# Patient Record
Sex: MALE | Race: WHITE | NOT HISPANIC OR LATINO | Employment: FULL TIME | URBAN - METROPOLITAN AREA
[De-identification: names, ages, dates, MRNs, and addresses within clinical notes are randomized per-mention and may not be internally consistent; named-entity substitution may affect disease eponyms.]

---

## 2019-01-31 ENCOUNTER — APPOINTMENT (OUTPATIENT)
Dept: RADIOLOGY | Facility: IMAGING CENTER | Age: 29
End: 2019-01-31
Attending: PHYSICIAN ASSISTANT
Payer: COMMERCIAL

## 2019-01-31 ENCOUNTER — OFFICE VISIT (OUTPATIENT)
Dept: URGENT CARE | Facility: CLINIC | Age: 29
End: 2019-01-31
Payer: COMMERCIAL

## 2019-01-31 VITALS
DIASTOLIC BLOOD PRESSURE: 68 MMHG | HEART RATE: 85 BPM | BODY MASS INDEX: 21.98 KG/M2 | OXYGEN SATURATION: 98 % | TEMPERATURE: 99.3 F | HEIGHT: 68 IN | WEIGHT: 145 LBS | SYSTOLIC BLOOD PRESSURE: 100 MMHG | RESPIRATION RATE: 16 BRPM

## 2019-01-31 DIAGNOSIS — J98.01 BRONCHOSPASM: ICD-10-CM

## 2019-01-31 DIAGNOSIS — R55 SYNCOPE, UNSPECIFIED SYNCOPE TYPE: ICD-10-CM

## 2019-01-31 DIAGNOSIS — Z87.892 HISTORY OF ANAPHYLAXIS: ICD-10-CM

## 2019-01-31 LAB — EKG 4674: NORMAL

## 2019-01-31 PROCEDURE — 99204 OFFICE O/P NEW MOD 45 MIN: CPT | Performed by: PHYSICIAN ASSISTANT

## 2019-01-31 PROCEDURE — 71046 X-RAY EXAM CHEST 2 VIEWS: CPT | Mod: TC | Performed by: PHYSICIAN ASSISTANT

## 2019-01-31 PROCEDURE — 93000 ELECTROCARDIOGRAM COMPLETE: CPT | Performed by: PHYSICIAN ASSISTANT

## 2019-01-31 RX ORDER — ALBUTEROL SULFATE 90 UG/1
2 AEROSOL, METERED RESPIRATORY (INHALATION) EVERY 6 HOURS PRN
Qty: 1 INHALER | Refills: 0 | Status: SHIPPED | OUTPATIENT
Start: 2019-01-31 | End: 2019-06-04

## 2019-01-31 RX ORDER — EPINEPHRINE 0.3 MG/.3ML
0.3 INJECTION SUBCUTANEOUS ONCE
Qty: 0.3 ML | Refills: 0 | Status: SHIPPED | OUTPATIENT
Start: 2019-01-31 | End: 2019-01-31

## 2019-01-31 ASSESSMENT — ENCOUNTER SYMPTOMS
HEMOPTYSIS: 0
WHEEZING: 0
PALPITATIONS: 0
CHILLS: 0
DIZZINESS: 1
FEVER: 0
SHORTNESS OF BREATH: 0
SORE THROAT: 0
COUGH: 1
SPUTUM PRODUCTION: 0

## 2019-01-31 NOTE — PATIENT INSTRUCTIONS
Orthostatic Hypotension  Orthostatic hypotension is a sudden drop in blood pressure that happens when you quickly change positions, such as when you get up from a seated or lying position. Blood pressure is a measurement of how strongly, or weakly, your blood is pressing against the walls of your arteries. Arteries are blood vessels that carry blood from your heart throughout your body. When blood pressure is too low, you may not get enough blood to your brain or to the rest of your organs. This can cause weakness, light-headedness, rapid heartbeat, and fainting. This can last for just a few seconds or for up to a few minutes.  Orthostatic hypotension is usually not a serious problem. However, if it happens frequently or gets worse, it may be a sign of something more serious.  What are the causes?  This condition may be caused by:  · Sudden changes in posture, such as standing up quickly after you have been sitting or lying down.  · Blood loss.  · Loss of body fluids (dehydration).  · Heart problems.  · Hormone (endocrine) problems.  · Pregnancy.  · Severe infection.  · Lack of certain nutrients.  · Severe allergic reactions (anaphylaxis).  · Certain medicines, such as blood pressure medicine or medicines that make the body lose excess fluids (diuretics). Sometimes, this condition can be caused by not taking medicine as directed, such as taking too much of a certain medicine.  What increases the risk?  Certain factors can make you more likely to develop orthostatic hypotension, including:  · Age. Risk increases as you get older.  · Conditions that affect the heart or the central nervous system.  · Taking certain medicines, such as blood pressure medicine or diuretics.  · Being pregnant.  What are the signs or symptoms?  Symptoms of this condition may include:  · Weakness.  · Light-headedness.  · Dizziness.  · Blurred vision.  · Fatigue.  · Rapid heartbeat.  · Fainting, in severe cases.  How is this diagnosed?  This  "condition is diagnosed based on:  · Your medical history.  · Your symptoms.  · Your blood pressure measurement. Your health care provider will check your blood pressure when you are:  ¨ Lying down.  ¨ Sitting.  ¨ Standing.  A blood pressure reading is recorded as two numbers, such as \"120 over 80\" (or 120/80). The first (\"top\") number is called the systolic pressure. It is a measure of the pressure in your arteries as your heart beats. The second (\"bottom\") number is called the diastolic pressure. It is a measure of the pressure in your arteries when your heart relaxes between beats. Blood pressure is measured in a unit called mm Hg. Healthy blood pressure for adults is 120/80. If your blood pressure is below 90/60, you may be diagnosed with hypotension.  Other information or tests that may be used to diagnose orthostatic hypotension include:  · Your other vital signs, such as your heart rate and temperature.  · Blood tests.  · Tilt table test. For this test, you will be safely secured to a table that moves you from a lying position to an upright position. Your heart rhythm and blood pressure will be monitored during the test.  How is this treated?  Treatment for this condition may include:  · Changing your diet. This may involve eating more salt (sodium) or drinking more water.  · Taking medicines to raise your blood pressure.  · Changing the dosage of certain medicines you are taking that might be lowering your blood pressure.  · Wearing compression stockings. These stockings help to prevent blood clots and reduce swelling in your legs.  In some cases, you may need to go to the hospital for:  · Fluid replacement. This means you will receive fluids through an IV tube.  · Blood replacement. This means you will receive donated blood through an IV tube (transfusion).  · Treating an infection or heart problems, if this applies.  · Monitoring. You may need to be monitored while medicines that you are taking wear " off.  Follow these instructions at home:  Eating and drinking  · Drink enough fluid to keep your urine clear or pale yellow.  · Eat a healthy diet and follow instructions from your health care provider about eating or drinking restrictions. A healthy diet includes:  ¨ Fresh fruits and vegetables.  ¨ Whole grains.  ¨ Lean meats.  ¨ Low-fat dairy products.  · Eat extra salt only as directed. Do not add extra salt to your diet unless your health care provider told you to do that.  · Eat frequent, small meals.  · Avoid standing up suddenly after eating.  Medicines  · Take over-the-counter and prescription medicines only as told by your health care provider.  ¨ Follow instructions from your health care provider about changing the dosage of your current medicines, if this applies.  ¨ Do not stop or adjust any of your medicines on your own.  General instructions  · Wear compression stockings as told by your health care provider.  · Get up slowly from lying down or sitting positions. This gives your blood pressure a chance to adjust.  · Avoid hot showers and excessive heat as directed by your health care provider.  · Return to your normal activities as told by your health care provider. Ask your health care provider what activities are safe for you.  · Do not use any products that contain nicotine or tobacco, such as cigarettes and e-cigarettes. If you need help quitting, ask your health care provider.  · Keep all follow-up visits as told by your health care provider. This is important.  Contact a health care provider if:  · You vomit.  · You have diarrhea.  · You have a fever for more than 2-3 days.  · You feel more thirsty than usual.  · You feel weak and tired.  Get help right away if:  · You have chest pain.  · You have a fast or irregular heartbeat.  · You develop numbness in any part of your body.  · You cannot move your arms or your legs.  · You have trouble speaking.  · You become sweaty or feel lightheaded.  · You  faint.  · You feel short of breath.  · You have trouble staying awake.  · You feel confused.  This information is not intended to replace advice given to you by your health care provider. Make sure you discuss any questions you have with your health care provider.  Document Released: 12/08/2003 Document Revised: 09/05/2017 Document Reviewed: 06/09/2017  [employer] Patient Education © 2017 [employer]

## 2019-02-01 NOTE — PROGRESS NOTES
Subjective:      Garcia Hilton is a 28 y.o. 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 )            Cough   This is a new problem. The current episode started in the past 7 days. The problem has been waxing and waning. The problem occurs constantly. Associated symptoms include chest pain. Pertinent negatives include no chills, ear pain, fever, hemoptysis, sore throat, shortness of breath or wheezing. Associated symptoms comments: Faint  . The symptoms are aggravated by lying down. He has tried nothing for the symptoms.       Review of Systems   Constitutional: Positive for malaise/fatigue. Negative for chills and fever.   HENT: Negative for congestion, ear pain and sore throat.    Respiratory: Positive for cough. Negative for hemoptysis, sputum production, shortness of breath and wheezing.    Cardiovascular: Positive for chest pain. Negative for palpitations.   Neurological: Positive for dizziness.   All other systems reviewed and are negative.    PMH:  has no past medical history on file.  MEDS:   Current Outpatient Prescriptions:   •  EPINEPHrine (EPIPEN) 0.3 MG/0.3ML Solution Auto-injector solution for injection, 0.3 mL by Intramuscular route Once for 1 dose., Disp: 0.3 mL, Rfl: 0  •  albuterol (PROAIR HFA) 108 (90 Base) MCG/ACT Aero Soln inhalation aerosol, Inhale 2 Puffs by mouth every 6 hours as needed for Shortness of Breath., Disp: 1 Inhaler, Rfl: 0  ALLERGIES: Not on File  SURGHX: History reviewed. No pertinent surgical history.  SOCHX:  reports that he has never smoked. He has never used smokeless tobacco. He reports that he drinks alcohol. He reports that he uses drugs, including Marijuana.  FH: Family history was reviewed, no pertinent findings to report  Medications, Allergies, and current problem list reviewed today in Epic       Objective:     /68   Pulse 85   Temp 37.4 °C (99.3  "°F)   Resp 16   Ht 1.727 m (5' 8\")   Wt 65.8 kg (145 lb)   SpO2 98%   BMI 22.05 kg/m²      Physical Exam   Constitutional: He is oriented to person, place, and time. He appears well-developed and well-nourished. He is active.  Non-toxic appearance. He does not have a sickly appearance. He does not appear ill. No distress. He is not intubated.   HENT:   Head: Normocephalic and atraumatic.   Right Ear: Hearing, tympanic membrane, external ear and ear canal normal.   Left Ear: Hearing, tympanic membrane, external ear and ear canal normal.   Nose: Nose normal.   Mouth/Throat: Uvula is midline, oropharynx is clear and moist and mucous membranes are normal.   Eyes: Conjunctivae, EOM and lids are normal.   Neck: Normal range of motion and full passive range of motion without pain. Neck supple.   Cardiovascular: Regular rhythm, S1 normal, S2 normal and normal heart sounds.  Exam reveals no gallop and no friction rub.    No murmur heard.  Pulmonary/Chest: Effort normal and breath sounds normal. No accessory muscle usage. No apnea, no tachypnea and no bradypnea. He is not intubated. No respiratory distress. He has no decreased breath sounds. He has no wheezes. He has no rhonchi. He has no rales. He exhibits no tenderness.   Musculoskeletal: Normal range of motion.   Neurological: He is alert and oriented to person, place, and time.   Skin: Skin is warm and dry.   Psychiatric: He has a normal mood and affect. His speech is normal and behavior is normal. Judgment and thought content normal.   Vitals reviewed.              1/31/2019 1:59 PM    HISTORY/REASON FOR EXAM:  Shortness of Breath syncope.      TECHNIQUE/EXAM DESCRIPTION AND NUMBER OF VIEWS:  Two views of the chest.    COMPARISON:  None available.    FINDINGS:      The mediastinal and cardiac silhouette is unremarkable.    The pulmonary vascularity is within normal limits.    The lung parenchyma is clear.    There is no significant pleural effusion.    There is no " visible pneumothorax.    There are no acute bony abnormalities.   Impression       1.  Unremarkable two view chest.     Interpreted by me     Rhythm: normal sinus   Rate: normal  Axis: normal  Ectopy: none  Conduction: normal  ST Segments: no acute change  T Waves: no acute change  Q Waves: none     Clinical Impression: no acute changes and normal EKG    Assessment/Plan:   Pt is a 28 yr old male who presents with cough for one week.  He states that he gets coughing fits while skiing.  He was experiencing some light headedness yesterday while at work.  Vitals normal. Lungs and heart sounds normal.  Chest x ray is normal.  No red flags on ekg.  Likely bronchospasm.  Pt also requests refill of epipen for food allergy.    1. Bronchospasm    - EKG  - DX-CHEST-2 VIEWS; Future  - albuterol (PROAIR HFA) 108 (90 Base) MCG/ACT Aero Soln inhalation aerosol; Inhale 2 Puffs by mouth every 6 hours as needed for Shortness of Breath.  Dispense: 1 Inhaler; Refill: 0    2. History of anaphylaxis    - EPINEPHrine (EPIPEN) 0.3 MG/0.3ML Solution Auto-injector solution for injection; 0.3 mL by Intramuscular route Once for 1 dose.  Dispense: 0.3 mL; Refill: 0  - REFERRAL TO ALLERGY    Differential diagnosis, natural history, supportive care discussed. Follow-up with primary care provider within 7-10 days, emergency room precautions discussed.  Patient and/or family appears understanding of information.  Handout and review of patients diagnosis and treatment was discussed extensively.

## 2019-02-03 ENCOUNTER — TELEPHONE (OUTPATIENT)
Dept: URGENT CARE | Facility: CLINIC | Age: 29
End: 2019-02-03

## 2019-02-03 NOTE — TELEPHONE ENCOUNTER
Patient called saying that he had called the pharmacy to ask if his prescriptions were ready but they only had the albuterol on file. He is going to  the prescription now, but he said he was also supposed to get a prescription for an EpiPen as well, and wanted to know if that could be called in for him.

## 2019-02-04 ENCOUNTER — TELEPHONE (OUTPATIENT)
Dept: URGENT CARE | Facility: CLINIC | Age: 29
End: 2019-02-04

## 2019-02-04 NOTE — TELEPHONE ENCOUNTER
Called pharmacy to ask about the problem the patient had with picking up his EpiPen. The pharmacy tech told me that he had already picked it up on 02/03/19.

## 2019-06-04 ENCOUNTER — OFFICE VISIT (OUTPATIENT)
Dept: URGENT CARE | Facility: CLINIC | Age: 29
End: 2019-06-04
Payer: COMMERCIAL

## 2019-06-04 ENCOUNTER — TELEPHONE (OUTPATIENT)
Dept: SCHEDULING | Facility: IMAGING CENTER | Age: 29
End: 2019-06-04

## 2019-06-04 ENCOUNTER — APPOINTMENT (OUTPATIENT)
Dept: RADIOLOGY | Facility: MEDICAL CENTER | Age: 29
End: 2019-06-04
Attending: INTERNAL MEDICINE
Payer: COMMERCIAL

## 2019-06-04 ENCOUNTER — HOSPITAL ENCOUNTER (OUTPATIENT)
Facility: MEDICAL CENTER | Age: 29
End: 2019-06-05
Attending: EMERGENCY MEDICINE | Admitting: INTERNAL MEDICINE
Payer: COMMERCIAL

## 2019-06-04 VITALS
TEMPERATURE: 98.6 F | HEART RATE: 98 BPM | DIASTOLIC BLOOD PRESSURE: 46 MMHG | WEIGHT: 145 LBS | RESPIRATION RATE: 16 BRPM | OXYGEN SATURATION: 97 % | HEIGHT: 68 IN | BODY MASS INDEX: 21.98 KG/M2 | SYSTOLIC BLOOD PRESSURE: 96 MMHG

## 2019-06-04 DIAGNOSIS — R55 VASOVAGAL NEAR SYNCOPE: Primary | ICD-10-CM

## 2019-06-04 DIAGNOSIS — I95.9 HYPOTENSION, UNSPECIFIED HYPOTENSION TYPE: ICD-10-CM

## 2019-06-04 DIAGNOSIS — A41.9 SEPSIS, DUE TO UNSPECIFIED ORGANISM: ICD-10-CM

## 2019-06-04 DIAGNOSIS — R55 PRE-SYNCOPE: ICD-10-CM

## 2019-06-04 PROBLEM — D72.829 LEUKOCYTOSIS: Status: ACTIVE | Noted: 2019-06-04

## 2019-06-04 LAB
ALBUMIN SERPL BCP-MCNC: 4.7 G/DL (ref 3.2–4.9)
ALBUMIN/GLOB SERPL: 1.6 G/DL
ALP SERPL-CCNC: 52 U/L (ref 30–99)
ALT SERPL-CCNC: 15 U/L (ref 2–50)
ANION GAP SERPL CALC-SCNC: 9 MMOL/L (ref 0–11.9)
APPEARANCE UR: CLEAR
AST SERPL-CCNC: 18 U/L (ref 12–45)
BASOPHILS # BLD AUTO: 0.5 % (ref 0–1.8)
BASOPHILS # BLD: 0.15 K/UL (ref 0–0.12)
BILIRUB SERPL-MCNC: 0.8 MG/DL (ref 0.1–1.5)
BILIRUB UR QL STRIP.AUTO: NEGATIVE
BUN SERPL-MCNC: 18 MG/DL (ref 8–22)
CALCIUM SERPL-MCNC: 9.9 MG/DL (ref 8.5–10.5)
CHLORIDE SERPL-SCNC: 104 MMOL/L (ref 96–112)
CO2 SERPL-SCNC: 26 MMOL/L (ref 20–33)
COLOR UR: YELLOW
COMMENT 1642: NORMAL
CREAT SERPL-MCNC: 1.06 MG/DL (ref 0.5–1.4)
EOSINOPHIL # BLD AUTO: 0.02 K/UL (ref 0–0.51)
EOSINOPHIL NFR BLD: 0.1 % (ref 0–6.9)
ERYTHROCYTE [DISTWIDTH] IN BLOOD BY AUTOMATED COUNT: 39.8 FL (ref 35.9–50)
FLUAV RNA SPEC QL NAA+PROBE: NEGATIVE
FLUBV RNA SPEC QL NAA+PROBE: NEGATIVE
GLOBULIN SER CALC-MCNC: 3 G/DL (ref 1.9–3.5)
GLUCOSE SERPL-MCNC: 97 MG/DL (ref 65–99)
GLUCOSE UR STRIP.AUTO-MCNC: NEGATIVE MG/DL
HCT VFR BLD AUTO: 45.3 % (ref 42–52)
HGB BLD-MCNC: 15.6 G/DL (ref 14–18)
IMM GRANULOCYTES # BLD AUTO: 0.18 K/UL (ref 0–0.11)
IMM GRANULOCYTES NFR BLD AUTO: 0.6 % (ref 0–0.9)
KETONES UR STRIP.AUTO-MCNC: ABNORMAL MG/DL
LACTATE BLD-SCNC: 1.3 MMOL/L (ref 0.5–2)
LEUKOCYTE ESTERASE UR QL STRIP.AUTO: NEGATIVE
LYMPHOCYTES # BLD AUTO: 1.43 K/UL (ref 1–4.8)
LYMPHOCYTES NFR BLD: 4.7 % (ref 22–41)
MAGNESIUM SERPL-MCNC: 1.9 MG/DL (ref 1.5–2.5)
MCH RBC QN AUTO: 31 PG (ref 27–33)
MCHC RBC AUTO-ENTMCNC: 34.4 G/DL (ref 33.7–35.3)
MCV RBC AUTO: 90.1 FL (ref 81.4–97.8)
MICRO URNS: ABNORMAL
MONOCYTES # BLD AUTO: 0.62 K/UL (ref 0–0.85)
MONOCYTES NFR BLD AUTO: 2 % (ref 0–13.4)
MORPHOLOGY BLD-IMP: NORMAL
NEUTROPHILS # BLD AUTO: 28.13 K/UL (ref 1.82–7.42)
NEUTROPHILS NFR BLD: 92.1 % (ref 44–72)
NITRITE UR QL STRIP.AUTO: NEGATIVE
NRBC # BLD AUTO: 0 K/UL
NRBC BLD-RTO: 0 /100 WBC
PH UR STRIP.AUTO: 6.5 [PH]
PLATELET # BLD AUTO: 221 K/UL (ref 164–446)
PMV BLD AUTO: 11.5 FL (ref 9–12.9)
POTASSIUM SERPL-SCNC: 4.2 MMOL/L (ref 3.6–5.5)
PROT SERPL-MCNC: 7.7 G/DL (ref 6–8.2)
PROT UR QL STRIP: NEGATIVE MG/DL
RBC # BLD AUTO: 5.03 M/UL (ref 4.7–6.1)
RBC UR QL AUTO: NEGATIVE
SODIUM SERPL-SCNC: 139 MMOL/L (ref 135–145)
SP GR UR STRIP.AUTO: 1.02
UROBILINOGEN UR STRIP.AUTO-MCNC: 0.2 MG/DL
WBC # BLD AUTO: 30.4 K/UL (ref 4.8–10.8)

## 2019-06-04 PROCEDURE — 99214 OFFICE O/P EST MOD 30 MIN: CPT | Performed by: PHYSICIAN ASSISTANT

## 2019-06-04 PROCEDURE — 85540 WBC ALKALINE PHOSPHATASE: CPT

## 2019-06-04 PROCEDURE — 93005 ELECTROCARDIOGRAM TRACING: CPT | Performed by: STUDENT IN AN ORGANIZED HEALTH CARE EDUCATION/TRAINING PROGRAM

## 2019-06-04 PROCEDURE — 36415 COLL VENOUS BLD VENIPUNCTURE: CPT

## 2019-06-04 PROCEDURE — 80053 COMPREHEN METABOLIC PANEL: CPT

## 2019-06-04 PROCEDURE — 81003 URINALYSIS AUTO W/O SCOPE: CPT

## 2019-06-04 PROCEDURE — 99285 EMERGENCY DEPT VISIT HI MDM: CPT

## 2019-06-04 PROCEDURE — 83605 ASSAY OF LACTIC ACID: CPT

## 2019-06-04 PROCEDURE — 71045 X-RAY EXAM CHEST 1 VIEW: CPT

## 2019-06-04 PROCEDURE — 87502 INFLUENZA DNA AMP PROBE: CPT

## 2019-06-04 PROCEDURE — 87040 BLOOD CULTURE FOR BACTERIA: CPT | Mod: 91

## 2019-06-04 PROCEDURE — 83735 ASSAY OF MAGNESIUM: CPT

## 2019-06-04 PROCEDURE — G0378 HOSPITAL OBSERVATION PER HR: HCPCS

## 2019-06-04 PROCEDURE — 700105 HCHG RX REV CODE 258: Performed by: STUDENT IN AN ORGANIZED HEALTH CARE EDUCATION/TRAINING PROGRAM

## 2019-06-04 PROCEDURE — 85025 COMPLETE CBC W/AUTO DIFF WBC: CPT

## 2019-06-04 RX ORDER — BISACODYL 10 MG
10 SUPPOSITORY, RECTAL RECTAL
Status: DISCONTINUED | OUTPATIENT
Start: 2019-06-04 | End: 2019-06-05 | Stop reason: HOSPADM

## 2019-06-04 RX ORDER — ACETAMINOPHEN 325 MG/1
650 TABLET ORAL EVERY 6 HOURS PRN
Status: DISCONTINUED | OUTPATIENT
Start: 2019-06-04 | End: 2019-06-05 | Stop reason: HOSPADM

## 2019-06-04 RX ORDER — LABETALOL HYDROCHLORIDE 5 MG/ML
10 INJECTION, SOLUTION INTRAVENOUS EVERY 4 HOURS PRN
Status: DISCONTINUED | OUTPATIENT
Start: 2019-06-04 | End: 2019-06-05 | Stop reason: HOSPADM

## 2019-06-04 RX ORDER — SODIUM CHLORIDE 9 MG/ML
500 INJECTION, SOLUTION INTRAVENOUS
Status: DISCONTINUED | OUTPATIENT
Start: 2019-06-04 | End: 2019-06-04

## 2019-06-04 RX ORDER — AMOXICILLIN 250 MG
2 CAPSULE ORAL 2 TIMES DAILY
Status: DISCONTINUED | OUTPATIENT
Start: 2019-06-04 | End: 2019-06-05 | Stop reason: HOSPADM

## 2019-06-04 RX ORDER — GUAIFENESIN/DEXTROMETHORPHAN 100-10MG/5
10 SYRUP ORAL EVERY 6 HOURS PRN
Status: DISCONTINUED | OUTPATIENT
Start: 2019-06-04 | End: 2019-06-05 | Stop reason: HOSPADM

## 2019-06-04 RX ORDER — POLYETHYLENE GLYCOL 3350 17 G/17G
1 POWDER, FOR SOLUTION ORAL
Status: DISCONTINUED | OUTPATIENT
Start: 2019-06-04 | End: 2019-06-05 | Stop reason: HOSPADM

## 2019-06-04 RX ORDER — SODIUM CHLORIDE 9 MG/ML
INJECTION, SOLUTION INTRAVENOUS CONTINUOUS
Status: DISCONTINUED | OUTPATIENT
Start: 2019-06-04 | End: 2019-06-05

## 2019-06-04 RX ADMIN — SODIUM CHLORIDE: 9 INJECTION, SOLUTION INTRAVENOUS at 19:48

## 2019-06-04 ASSESSMENT — LIFESTYLE VARIABLES
TOTAL SCORE: 0
EVER_SMOKED: NEVER
TOTAL SCORE: 0
ALCOHOL_USE: YES
HAVE YOU EVER FELT YOU SHOULD CUT DOWN ON YOUR DRINKING: NO
ON A TYPICAL DAY WHEN YOU DRINK ALCOHOL HOW MANY DRINKS DO YOU HAVE: 1
HAVE PEOPLE ANNOYED YOU BY CRITICIZING YOUR DRINKING: NO
AVERAGE NUMBER OF DAYS PER WEEK YOU HAVE A DRINK CONTAINING ALCOHOL: 3
CONSUMPTION TOTAL: POSITIVE
TOTAL SCORE: 0
EVER FELT BAD OR GUILTY ABOUT YOUR DRINKING: NO
HOW MANY TIMES IN THE PAST YEAR HAVE YOU HAD 5 OR MORE DRINKS IN A DAY: 5
EVER HAD A DRINK FIRST THING IN THE MORNING TO STEADY YOUR NERVES TO GET RID OF A HANGOVER: NO

## 2019-06-04 ASSESSMENT — PATIENT HEALTH QUESTIONNAIRE - PHQ9
1. LITTLE INTEREST OR PLEASURE IN DOING THINGS: NOT AT ALL
SUM OF ALL RESPONSES TO PHQ9 QUESTIONS 1 AND 2: 0
2. FEELING DOWN, DEPRESSED, IRRITABLE, OR HOPELESS: NOT AT ALL

## 2019-06-04 NOTE — PROGRESS NOTES
Subjective:      Pt is a 28 y.o. male who presents with near syncope          HPI  This is a recurrent issue. Pt notes worsening episodes of feeling faint x 3 days and was diagnosed with orthostatic hypotension in the past but notes no cardiac work up and now notes more frequent episodes despite appropriate hydration and eating well and exercising. Pt has not taken any Rx medications for this condition. Pt states the pain is a 1-2/10 during near syncopal event, aching in nature and worse at night. Pt denies CP, SOB, NVD, paresthesias, headaches,  change in vision, hives, or other joint pain. The pt's medication list, problem list, and allergies have been evaluated and reviewed during today's visit.    PMH:  Negative per pt.      PSH:  Negative per pt.        Fam Hx:  the patient's family history is not pertinent to their current complaint      Soc HX:  Social History     Social History   • Marital status: Single     Spouse name: N/A   • Number of children: N/A   • Years of education: N/A     Occupational History   • Not on file.     Social History Main Topics   • Smoking status: Never Smoker   • Smokeless tobacco: Never Used   • Alcohol use Yes      Comment: socially   • Drug use: Yes     Types: Marijuana      Comment: occa   • Sexual activity: Not on file     Other Topics Concern   • Not on file     Social History Narrative   • No narrative on file         Medications:    Current Outpatient Prescriptions:   •  albuterol (PROAIR HFA) 108 (90 Base) MCG/ACT Aero Soln inhalation aerosol, Inhale 2 Puffs by mouth every 6 hours as needed for Shortness of Breath., Disp: 1 Inhaler, Rfl: 0      Allergies:  Patient has no allergy information on record.    ROS  Constitutional: Negative for fever, chills and malaise/fatigue.   HENT: Negative for congestion and sore throat.    Eyes: Negative for blurred vision, double vision and photophobia.   Respiratory: Negative for cough and shortness of breath.  Cardiovascular: Negative for  chest pain and palpitations.   Gastrointestinal: Negative for heartburn, nausea, vomiting, abdominal pain, diarrhea and constipation.   Genitourinary: Negative for dysuria and flank pain.   Musculoskeletal: Negative for joint pain and myalgias.   Skin: Negative for itching and rash.   Neurological: +dizziness with near syncope  Endo/Heme/Allergies: Does not bruise/bleed easily.   Psychiatric/Behavioral: Negative for depression. The patient is not nervous/anxious.           Objective:        Vitals:    06/04/19 1305   BP: (!) 96/46   Pulse: 98   Resp: 16   Temp: 37 °C (98.6 °F)   SpO2: 97%         Physical Exam      Constitutional: PT is oriented to person, place, and time. PT appears well-developed and well-nourished. No distress.   HENT:   Head: Normocephalic and atraumatic.   Mouth/Throat: Oropharynx is clear and moist. No oropharyngeal exudate.   Eyes: Conjunctivae normal and EOM are normal. Pupils are equal, round, and reactive to light.   Neck: Normal range of motion. Neck supple. No thyromegaly present.   Cardiovascular: Normal rate, regular rhythm, normal heart sounds and intact distal pulses.  Exam reveals no gallop and no friction rub.    No murmur heard.  Pulmonary/Chest: Effort normal and breath sounds normal. No respiratory distress. PT has no wheezes. PT has no rales. Pt exhibits no tenderness.   Abdominal: Soft. Bowel sounds are normal. PT exhibits no distension and no mass. There is no tenderness. There is no rebound and no guarding.   Musculoskeletal: Normal range of motion. PT exhibits no edema and no tenderness.   Neurological: PT is alert and oriented to person, place, and time. PT has normal reflexes. No cranial nerve deficit.   Skin: Skin is warm and dry. No rash noted. PT is not diaphoretic. No erythema.       Psychiatric: PT has a normal mood and affect. PT behavior is normal. Judgment and thought content normal.          Assessment/Plan:     1. Vasovagal near syncope  2.  Hypotension    EKG-->NSR     PT will likely need full cardiac work up  TO [facility] NOW for further evaluation. Spoke with ER charge nurse, Murray ,on the phone, attending at the ER , and she graciously accepted transfer of care for further imaging and evaluation of the pt. Reiterated to patient that although a provider to provider transfer was made this will not necessarily expedite the ER process.  ER care coordinator was not available  PT to go POV to ER now  AVS with medical info given.  Pt was in full understanding and agreement with the plan.  Differential diagnosis, natural history, supportive care, and indications for immediate follow-up discussed. All questions answered. Patient agrees with the plan of care.  Follow-up as needed if symptoms worsen or fail to improve.

## 2019-06-04 NOTE — ED NOTES
Ambulatory to 67 with same report as triage note.  Reports for past few months has had episodes of lightheaded, nausea, slight SOB, and cold sweats.  Reports intensity of symptoms increased last night prompting him to go to  this am.

## 2019-06-04 NOTE — ED TRIAGE NOTES
"Pt sent from Urgent care for episode of \"low blood pressure\". Systolic 94. Pt sent for further care, pt states \"dealing with orthostatic BP for 2 months\". No symptoms at this time.   "

## 2019-06-05 ENCOUNTER — APPOINTMENT (OUTPATIENT)
Dept: CARDIOLOGY | Facility: MEDICAL CENTER | Age: 29
End: 2019-06-05
Attending: STUDENT IN AN ORGANIZED HEALTH CARE EDUCATION/TRAINING PROGRAM
Payer: COMMERCIAL

## 2019-06-05 VITALS
RESPIRATION RATE: 20 BRPM | SYSTOLIC BLOOD PRESSURE: 125 MMHG | BODY MASS INDEX: 22.05 KG/M2 | DIASTOLIC BLOOD PRESSURE: 69 MMHG | TEMPERATURE: 99.1 F | WEIGHT: 145.5 LBS | OXYGEN SATURATION: 97 % | HEART RATE: 75 BPM | HEIGHT: 68 IN

## 2019-06-05 LAB
ALBUMIN SERPL BCP-MCNC: 3.9 G/DL (ref 3.2–4.9)
ALBUMIN/GLOB SERPL: 1.6 G/DL
ALP SERPL-CCNC: 41 U/L (ref 30–99)
ALT SERPL-CCNC: 11 U/L (ref 2–50)
ANION GAP SERPL CALC-SCNC: 7 MMOL/L (ref 0–11.9)
AST SERPL-CCNC: 17 U/L (ref 12–45)
BASOPHILS # BLD AUTO: 0.4 % (ref 0–1.8)
BASOPHILS # BLD: 0.05 K/UL (ref 0–0.12)
BILIRUB SERPL-MCNC: 0.5 MG/DL (ref 0.1–1.5)
BUN SERPL-MCNC: 20 MG/DL (ref 8–22)
CALCIUM SERPL-MCNC: 9.1 MG/DL (ref 8.5–10.5)
CHLORIDE SERPL-SCNC: 111 MMOL/L (ref 96–112)
CO2 SERPL-SCNC: 24 MMOL/L (ref 20–33)
CORTIS SERPL-MCNC: 7.9 UG/DL (ref 0–23)
CREAT SERPL-MCNC: 1 MG/DL (ref 0.5–1.4)
CRP SERPL HS-MCNC: 8.41 MG/DL (ref 0–0.75)
EKG IMPRESSION: NORMAL
EOSINOPHIL # BLD AUTO: 0.19 K/UL (ref 0–0.51)
EOSINOPHIL NFR BLD: 1.4 % (ref 0–6.9)
ERYTHROCYTE [DISTWIDTH] IN BLOOD BY AUTOMATED COUNT: 42.1 FL (ref 35.9–50)
ERYTHROCYTE [SEDIMENTATION RATE] IN BLOOD BY WESTERGREN METHOD: 8 MM/HOUR (ref 0–15)
GLOBULIN SER CALC-MCNC: 2.4 G/DL (ref 1.9–3.5)
GLUCOSE SERPL-MCNC: 99 MG/DL (ref 65–99)
HCT VFR BLD AUTO: 39.8 % (ref 42–52)
HGB BLD-MCNC: 13.5 G/DL (ref 14–18)
IMM GRANULOCYTES # BLD AUTO: 0.04 K/UL (ref 0–0.11)
IMM GRANULOCYTES NFR BLD AUTO: 0.3 % (ref 0–0.9)
LV EJECT FRACT  99904: 65
LV EJECT FRACT MOD 2C 99903: 67.63
LV EJECT FRACT MOD 4C 99902: 61.47
LV EJECT FRACT MOD BP 99901: 63.08
LYMPHOCYTES # BLD AUTO: 1.78 K/UL (ref 1–4.8)
LYMPHOCYTES NFR BLD: 13.2 % (ref 22–41)
MCH RBC QN AUTO: 31.5 PG (ref 27–33)
MCHC RBC AUTO-ENTMCNC: 33.9 G/DL (ref 33.7–35.3)
MCV RBC AUTO: 92.8 FL (ref 81.4–97.8)
MONOCYTES # BLD AUTO: 0.49 K/UL (ref 0–0.85)
MONOCYTES NFR BLD AUTO: 3.6 % (ref 0–13.4)
NEUTROPHILS # BLD AUTO: 10.96 K/UL (ref 1.82–7.42)
NEUTROPHILS NFR BLD: 81.1 % (ref 44–72)
NRBC # BLD AUTO: 0 K/UL
NRBC BLD-RTO: 0 /100 WBC
PLATELET # BLD AUTO: 186 K/UL (ref 164–446)
PMV BLD AUTO: 12 FL (ref 9–12.9)
POTASSIUM SERPL-SCNC: 4 MMOL/L (ref 3.6–5.5)
PROT SERPL-MCNC: 6.3 G/DL (ref 6–8.2)
RBC # BLD AUTO: 4.29 M/UL (ref 4.7–6.1)
SODIUM SERPL-SCNC: 142 MMOL/L (ref 135–145)
T4 FREE SERPL-MCNC: 1.16 NG/DL (ref 0.53–1.43)
TSH SERPL DL<=0.005 MIU/L-ACNC: 3.14 UIU/ML (ref 0.38–5.33)
WBC # BLD AUTO: 13.5 K/UL (ref 4.8–10.8)

## 2019-06-05 PROCEDURE — 80053 COMPREHEN METABOLIC PANEL: CPT

## 2019-06-05 PROCEDURE — 84439 ASSAY OF FREE THYROXINE: CPT

## 2019-06-05 PROCEDURE — 93306 TTE W/DOPPLER COMPLETE: CPT

## 2019-06-05 PROCEDURE — 700105 HCHG RX REV CODE 258: Performed by: INTERNAL MEDICINE

## 2019-06-05 PROCEDURE — 93010 ELECTROCARDIOGRAM REPORT: CPT | Performed by: INTERNAL MEDICINE

## 2019-06-05 PROCEDURE — 86140 C-REACTIVE PROTEIN: CPT

## 2019-06-05 PROCEDURE — 700105 HCHG RX REV CODE 258: Performed by: STUDENT IN AN ORGANIZED HEALTH CARE EDUCATION/TRAINING PROGRAM

## 2019-06-05 PROCEDURE — 84443 ASSAY THYROID STIM HORMONE: CPT

## 2019-06-05 PROCEDURE — 93306 TTE W/DOPPLER COMPLETE: CPT | Mod: 26 | Performed by: INTERNAL MEDICINE

## 2019-06-05 PROCEDURE — 36415 COLL VENOUS BLD VENIPUNCTURE: CPT

## 2019-06-05 PROCEDURE — 82533 TOTAL CORTISOL: CPT

## 2019-06-05 PROCEDURE — 700111 HCHG RX REV CODE 636 W/ 250 OVERRIDE (IP): Performed by: STUDENT IN AN ORGANIZED HEALTH CARE EDUCATION/TRAINING PROGRAM

## 2019-06-05 PROCEDURE — 85652 RBC SED RATE AUTOMATED: CPT

## 2019-06-05 PROCEDURE — 96365 THER/PROPH/DIAG IV INF INIT: CPT

## 2019-06-05 PROCEDURE — 85025 COMPLETE CBC W/AUTO DIFF WBC: CPT

## 2019-06-05 PROCEDURE — 99235 HOSP IP/OBS SAME DATE MOD 70: CPT | Mod: GC | Performed by: INTERNAL MEDICINE

## 2019-06-05 PROCEDURE — G0378 HOSPITAL OBSERVATION PER HR: HCPCS

## 2019-06-05 RX ORDER — SODIUM CHLORIDE 9 MG/ML
1000 INJECTION, SOLUTION INTRAVENOUS ONCE
Status: COMPLETED | OUTPATIENT
Start: 2019-06-05 | End: 2019-06-05

## 2019-06-05 RX ADMIN — CEFTRIAXONE SODIUM 1 G: 1 INJECTION, POWDER, FOR SOLUTION INTRAMUSCULAR; INTRAVENOUS at 06:07

## 2019-06-05 RX ADMIN — SODIUM CHLORIDE: 9 INJECTION, SOLUTION INTRAVENOUS at 09:25

## 2019-06-05 RX ADMIN — SODIUM CHLORIDE 1000 ML: 9 INJECTION, SOLUTION INTRAVENOUS at 13:03

## 2019-06-05 ASSESSMENT — ENCOUNTER SYMPTOMS
PALPITATIONS: 0
HEARTBURN: 0
TINGLING: 0
TREMORS: 0
COUGH: 1
EYE PAIN: 0
DIAPHORESIS: 1
BACK PAIN: 0
MYALGIAS: 0
HEADACHES: 1
HEMOPTYSIS: 0
ABDOMINAL PAIN: 0
LOSS OF CONSCIOUSNESS: 0
FOCAL WEAKNESS: 0
EYE REDNESS: 0
SPEECH CHANGE: 0
VOMITING: 0
DIARRHEA: 0
ORTHOPNEA: 0
WHEEZING: 0
CONSTIPATION: 0
WEIGHT LOSS: 0
BLURRED VISION: 0
NERVOUS/ANXIOUS: 0
DIZZINESS: 0
DOUBLE VISION: 0
SHORTNESS OF BREATH: 1
SPUTUM PRODUCTION: 1
NECK PAIN: 0
SENSORY CHANGE: 0
NAUSEA: 0
FEVER: 0
BRUISES/BLEEDS EASILY: 0
CHILLS: 0
EYE DISCHARGE: 0
FLANK PAIN: 0
BLOOD IN STOOL: 0
SORE THROAT: 0
WEAKNESS: 0
DEPRESSION: 0
SINUS PAIN: 0

## 2019-06-05 NOTE — ED PROVIDER NOTES
CHIEF COMPLAINT(1/4)  Chief Complaint   Patient presents with   • Sent by MD [place]  Garcia Hilton is a 28 y.o. male who presents with recurrent worsening episodes of presyncope for 2 months.  These episodes happen when he is standing for a few hours, he has a sensation of flushing, lightheadedness, palpitation, tunnel vision where he would have to lie down and wait for a few hours for the presyncope to pass.  He was diagnosed with orthostatic hypotension at urgent care but never had orthostatic vital signs according to him and was instructed to hydrate.  But this has been happening more frequently.  Last night, he had an episode of subjective fevers, nausea, sweating and chills with a similar episode of presyncope when he was lying down.  He went to urgent care where he had low normal blood pressure systolic 90s and sent to ER for further evaluation.    He denies any sore throat, cough, shortness of breath, dysuria, urgency, frequency, diarrhea, recent viral illness, sinusitis, dental procedure or dental infection.    He denies any IV drug use.  He has been with current female partner for 2 years and consistently uses condom for protection.    REVIEW OF SYSTEMS(1/10)  Pertinent positives include: Lightheadedness, palpitation, vision changes,.  Pertinent negatives include: denies any sore throat, cough, chest pain, shortness of breath, dysuria, urgency, frequency, diarrhea, recent viral illness, sinusitis, dental procedure or dental infection. Denies any open skin wound.   All other systems are negative.     PAST MEDICAL HISTORY(PFS1,2)   He denies any medical problem    FAMILY HISTORY  Father has hypertension.  Paternal grandmother had coronary artery disease.     SOCIAL HISTORY  Social History   Substance Use Topics   • Smoking status: Never Smoker   • Smokeless tobacco: Never Used   • Alcohol use Yes      Comment: socially     History   Drug Use   • Types: Marijuana     Comment: occa       CURRENT  MEDICATIONS  Home Medications     Reviewed by Mayra Varela R.N. (Registered Nurse) on 06/04/19 at 1619  Med List Status: Complete   Medication Last Dose Status        Patient Bennie Taking any Medications                       ALLERGIES  Allergies   Allergen Reactions   • Dust Mite Extract      Trouble breathing   • Egg White [Albumin]      Swollen and itchiness   • Fish      Hives, trouble breathing       PHYSICAL EXAM  VITAL SIGNS: /78   Pulse (!) 109   Temp 37.2 °C (99 °F) (Temporal)   Resp 16   Wt 67.3 kg (148 lb 5.9 oz)   SpO2 93%   BMI 22.56 kg/m²  Reviewed   Constitutional: Well developed, Well nourished, not in distress.  HENT: Normocephalic, atraumatic, bilateral external ears normal, oropharynx moist, No exudates or erythema. Neck is supple.  No cervical lymphadenopathy.  Eyes: PERRLA, conjunctiva pink, no scleral icterus.   Cardiovascular: S1, S2, no murmur, rub or gallop.  Respiratory: Clear to auscultation bilaterally without any wheezing or crepitation.  Gastrointestinal: Soft, nontender, bowel sounds present, no hepatosplenomegaly.  Skin: No erythema, no rash.   Genitourinary:  No costovertebral angle tenderness.   Neurologic: Alert & oriented x 3, cranial nerves 2-12 intact by passive exam.  No focal deficit noted.    Psychiatric: Affect normal, Judgment normal, Mood normal.     DIFFERENTIAL DIAGNOSIS:  Presyncope -vasovagal, arrhythmia, orthostatic hypotension  Infection -endocarditis, bacteremia of unclear source, viral infection   Hematologic malignancy, sepsis        RADIOLOGY/PROCEDURES  DX-CHEST-PORTABLE (1 VIEW)   Final Result      No evidence of acute cardiopulmonary process.          LABORATORY: Reviewed as below.  Results for orders placed or performed during the hospital encounter of 06/04/19   CBC WITH DIFFERENTIAL   Result Value Ref Range    WBC 30.4 (HH) 4.8 - 10.8 K/uL    RBC 5.03 4.70 - 6.10 M/uL    Hemoglobin 15.6 14.0 - 18.0 g/dL    Hematocrit 45.3 42.0 - 52.0 %     MCV 90.1 81.4 - 97.8 fL    MCH 31.0 27.0 - 33.0 pg    MCHC 34.4 33.7 - 35.3 g/dL    RDW 39.8 35.9 - 50.0 fL    Platelet Count 221 164 - 446 K/uL    MPV 11.5 9.0 - 12.9 fL    Neutrophils-Polys 92.10 (H) 44.00 - 72.00 %    Lymphocytes 4.70 (L) 22.00 - 41.00 %    Monocytes 2.00 0.00 - 13.40 %    Eosinophils 0.10 0.00 - 6.90 %    Basophils 0.50 0.00 - 1.80 %    Immature Granulocytes 0.60 0.00 - 0.90 %    Nucleated RBC 0.00 /100 WBC    Neutrophils (Absolute) 28.13 (H) 1.82 - 7.42 K/uL    Lymphs (Absolute) 1.43 1.00 - 4.80 K/uL    Monos (Absolute) 0.62 0.00 - 0.85 K/uL    Eos (Absolute) 0.02 0.00 - 0.51 K/uL    Baso (Absolute) 0.15 (H) 0.00 - 0.12 K/uL    Immature Granulocytes (abs) 0.18 (H) 0.00 - 0.11 K/uL    NRBC (Absolute) 0.00 K/uL   COMP METABOLIC PANEL   Result Value Ref Range    Sodium 139 135 - 145 mmol/L    Potassium 4.2 3.6 - 5.5 mmol/L    Chloride 104 96 - 112 mmol/L    Co2 26 20 - 33 mmol/L    Anion Gap 9.0 0.0 - 11.9    Glucose 97 65 - 99 mg/dL    Bun 18 8 - 22 mg/dL    Creatinine 1.06 0.50 - 1.40 mg/dL    Calcium 9.9 8.5 - 10.5 mg/dL    AST(SGOT) 18 12 - 45 U/L    ALT(SGPT) 15 2 - 50 U/L    Alkaline Phosphatase 52 30 - 99 U/L    Total Bilirubin 0.8 0.1 - 1.5 mg/dL    Albumin 4.7 3.2 - 4.9 g/dL    Total Protein 7.7 6.0 - 8.2 g/dL    Globulin 3.0 1.9 - 3.5 g/dL    A-G Ratio 1.6 g/dL   LACTIC ACID   Result Value Ref Range    Lactic Acid 1.3 0.5 - 2.0 mmol/L       INTERVENTIONS:  Will allow the admitting to choose antibiotics or observe patient pending blood cultures.       COURSE & MEDICAL DECISION MAKING  28-year-old healthy male came in with recurrent episodes of presyncope, subjective fever, chills. VSS, mild tachycardia during observation at the ER. physical exam was unremarkable without any clear source of infection. Labs show marked leukocytosis, WBC 30s with neutrophilia, suggestive of acute infection.   Blood cultures are obtained, chest x-ray and UA are pending.  He does not have insurance and does  not have a good plan for follow-up.  He will be admitted to [facility] for further work-up for sepsis, unclear source of infection, monitor blood culture,  possibly echocardiogram. discussed with Dr. Claudio who agreed to admit the patient.   Care is transferred to [facility] service and patient will be admitted in stable condition.    Review nursing notes and vital signs a final time 7:13 PM    PLAN:  Admission to [facility] service    CONDITION: guarded.    FINAL IMPRESSION  1. Sepsis, due to unspecified organism (HCC)    2.      Leukocytosis  3.      Near syncope      Electronically signed by: Francine Duncan, 6/4/2019 7:13 PM    I independently evaluated the patient and repeated the important components of the history and physical. I discussed the management with the resident. I have reviewed and agree with the pertinent clinical information above including history, exam, study findings, and recommendations.     Electronically signed by: Vasquez Quintero, 6/4/2019 8:04 PM

## 2019-06-05 NOTE — DISCHARGE INSTRUCTIONS
Discharge Instructions    Discharged to home by car with relative. Discharged via wheelchair, hospital escort: Yes.  Special equipment needed: Not Applicable    Be sure to schedule a follow-up appointment with your primary care doctor or any specialists as instructed.     Discharge Plan:   Diet Plan: Discussed  Activity Level: Discussed  Confirmed Follow up Appointment: Patient to Call and Schedule Appointment  Confirmed Symptoms Management: Discussed  Medication Reconciliation Updated: Yes  Influenza Vaccine Indication: Patient Refuses    I understand that a diet low in cholesterol, fat, and sodium is recommended for good health. Unless I have been given specific instructions below for another diet, I accept this instruction as my diet prescription.   Other diet: Heart healthy     Special Instructions: None    · Is patient discharged on Warfarin / Coumadin?   No     Depression / Suicide Risk    As you are discharged from this [facility] facility, it is important to learn how to keep safe from harming yourself.    Recognize the warning signs:  · Abrupt changes in personality, positive or negative- including increase in energy   · Giving away possessions  · Change in eating patterns- significant weight changes-  positive or negative  · Change in sleeping patterns- unable to sleep or sleeping all the time   · Unwillingness or inability to communicate  · Depression  · Unusual sadness, discouragement and loneliness  · Talk of wanting to die  · Neglect of personal appearance   · Rebelliousness- reckless behavior  · Withdrawal from people/activities they love  · Confusion- inability to concentrate     If you or a loved one observes any of these behaviors or has concerns about self-harm, here's what you can do:  · Talk about it- your feelings and reasons for harming yourself  · Remove any means that you might use to hurt yourself (examples: pills, rope, extension cords, firearm)  · Get professional help from the  community (Mental Health, Substance Abuse, psychological counseling)  · Do not be alone:Call your Safe Contact- someone whom you trust who will be there for you.  · Call your local CRISIS HOTLINE 942-2266 or 903-925-7467  · Call your local Children's Mobile Crisis Response Team [place] (679) 178-7093 or www.Attributor  · Call the toll free National Suicide Prevention Hotlines   · National Suicide Prevention Lifeline 388-556-OYMS (1488)  · National Hope Line Network 800-SUICIDE (926-9464)

## 2019-06-05 NOTE — PROGRESS NOTES
Pt consumed 100% of regular breakfast tray, tolerated well, denies any nausea/palpitations at this time

## 2019-06-05 NOTE — DISCHARGE SUMMARY
Internal Medicine Discharge Summary  Note Author: Kraig Bowser M.D.       Name Garcia Hilton     1990   Age/Sex 28 y.o. male   MRN [id number]         Admit Date:  2019       Discharge Date:   19    Service:   [facility] Internal Medicine [place] Team  Attending Physician(s):   Renato       Senior Resident(s):   Leon  Jj Resident(s):  Mague  PCP: Pcp Pt States None      Primary Diagnosis:   Orthostasis    Secondary Diagnoses:                Principal Problem:    Leukocytosis POA: Yes  Active Problems:    Pre-syncope POA: Yes  Resolved Problems:    * No resolved hospital problems. *      Hospital Summary (Brief Narrative):       28 y.o. male,Otherwise healthy without significant past medical history, recurrent presyncopal symptoms (sudden onset blurred vision/lightheadedness/dizziness/occipital headache/diaphoresis) occurring 3 times in the past month.  Incidentally noted to have white blood cell count of 30.4.  Patient does not meet sepsis criteria, did receive 1 g of ceftriaxone in emergency department.  Repeat white blood cell count the same day 13.5.  Suspect laboratory error.  He does report a recent history of trip to the [place] for 4 days and use of LSD and cocaine during that time, 2 weeks ago.    After 1 L fluid bolus today, orthostasis corrected.  Patient is in sinus bradycardia (high 50s) to sinus rhythm, without any arrhythmia, however he has not had any symptomatic orthostatic events.  A.m. cortisol (0600) 7.1, low normal.  Echocardiogram was normal, TSH was normal, free T4 was normal.    Patient is medically stable for discharge and he would like to go.  Follow-up with post inpatient (per scheduling) within 1 week, after labs, to be drawn on Monday.  Labs drawn Monday include CBC (monitor white blood cell count), cortisol stimulation test.  Recommend patient have Holter monitoring versus event monitoring.  Patient also reports that he felt anxious during these events,  has a strong family history of anxiety, and feels that he may have undiagnosed anxiety.  Consider anxiety treatment.  Patient also requests cardiology and neurology follow-up.  We discussed that some preliminary testing may be necessary before this can happen.    Patient given strict recall criteria to emergency department.  Recommend urine drug screen if patient returns to emergency department.        Patient /Hospital Summary (Details -- Problem Oriented) :          Pre-syncope   Assessment & Plan    Multiple episodes of almost passing out accompanied with nausea, diaphoresis, visual changes; happens when he stands quickly most of the time but last night occurred while laying down.   EKG was unrevealing   -Tele: Sinus rhythm/sinus bradycardia in high 50s (age-appropriate)  -Orthostatic vitals resolved after 1 L fluid bolus  -Echo normal  -Random cortisol, drawn at 600, 7.1 (low normal)  -Follow-up with primary care provider, establish a [facility], 1 week.  Follow-up labs this upcoming Monday, including cortisol stimulation test.     * Leukocytosis   Assessment & Plan    Multiple episodes of almost passing out accompanied with nausea, diaphoresis, visual changes; happens when he stands quickly most of the time but last night occurred while laying down.  Not associated with vertigo.  Echo was normal.  Patient had orthostatic tachycardia without hypotension, not meeting the criteria for postural orthostatic hypotension (greater than 40 bpm increase)  EKG was unrevealing   -Tele: Sinus rhythm/sinus bradycardia in high 50s (age-appropriate)           Consultants:     None    Procedures:        None    Imaging/ Testing:      EC-ECHOCARDIOGRAM COMPLETE W/O CONT   Final Result      DX-CHEST-PORTABLE (1 VIEW)   Final Result      No evidence of acute cardiopulmonary process.             Discharge Medications:         Medication Reconciliation: Completed       Medication List      You have not been prescribed any medications.          Can use .DISCHARGEMEDSLIST if going to another facility         Disposition:   To home with strict ED recall criteria in fair condition.    Diet:   Resume home diet    Activity:   As tolerated.    Instructions:        The patient was instructed to return to the ER in the event of worsening symptoms. I have counseled the patient on the importance of compliance and the patient has agreed to proceed with all medical recommendations and follow up plan indicated above.   The patient understands that all medications come with benefits and risks. Risks may include permanent injury or death and these risks can be minimized with close reassessment and monitoring.        Primary Care Provider:      Discharge summary faxed to primary care provider:  Deferred  Copy of discharge summary given to the patient: Deferred      Follow up appointment details :       Follow-up with post inpatient (per scheduling) within 1 week, after labs, to be drawn on Monday.  Labs drawn Monday include CBC (monitor white blood cell count), cortisol stimulation test.  Recommend patient have Holter monitoring versus event monitoring.  Patient also reports that he felt anxious during these events, has a strong family history of anxiety, and feels that he may have undiagnosed anxiety.  Consider anxiety treatment.     Pending Studies:        None    Time spent on discharge day patient visit, preparing discharge paperwork and arranging for patient follow up.    Summary of follow up issues:   Follow-up with constipation clinic for see follow-up appointment details.    Discharge Time (Minutes) :    90  Hospital Course Type:  Inpatient Stay >2 midnights      Condition on Discharge    ______________________________________________________________________    Interval history/exam for day of discharge:    See interval note 6/5/2019.  See above for updates since this note.      Most Recent Labs:    Lab Results   Component Value Date/Time    WBC 13.5 (H)  06/05/2019 06:00 AM    RBC 4.29 (L) 06/05/2019 06:00 AM    HEMOGLOBIN 13.5 (L) 06/05/2019 06:00 AM    HEMATOCRIT 39.8 (L) 06/05/2019 06:00 AM    MCV 92.8 06/05/2019 06:00 AM    MCH 31.5 06/05/2019 06:00 AM    MCHC 33.9 06/05/2019 06:00 AM    MPV 12.0 06/05/2019 06:00 AM    NEUTSPOLYS 81.10 (H) 06/05/2019 06:00 AM    LYMPHOCYTES 13.20 (L) 06/05/2019 06:00 AM    MONOCYTES 3.60 06/05/2019 06:00 AM    EOSINOPHILS 1.40 06/05/2019 06:00 AM    BASOPHILS 0.40 06/05/2019 06:00 AM      Lab Results   Component Value Date/Time    SODIUM 142 06/05/2019 06:00 AM    POTASSIUM 4.0 06/05/2019 06:00 AM    CHLORIDE 111 06/05/2019 06:00 AM    CO2 24 06/05/2019 06:00 AM    GLUCOSE 99 06/05/2019 06:00 AM    BUN 20 06/05/2019 06:00 AM    CREATININE 1.00 06/05/2019 06:00 AM      Lab Results   Component Value Date/Time    ALTSGPT 11 06/05/2019 06:00 AM    ASTSGOT 17 06/05/2019 06:00 AM    ALKPHOSPHAT 41 06/05/2019 06:00 AM    TBILIRUBIN 0.5 06/05/2019 06:00 AM    ALBUMIN 3.9 06/05/2019 06:00 AM    GLOBULIN 2.4 06/05/2019 06:00 AM     No results found for: PROTHROMBTM, INR

## 2019-06-05 NOTE — ASSESSMENT & PLAN NOTE
Multiple episodes of almost passing out accompanied with nausea, diaphoresis, visual changes; happens when he stands quickly most of the time but last night occurred while laying down.   EKG was unrevealing   -Tele: Sinus rhythm/sinus bradycardia in high 50s (age-appropriate)  -Orthostatic vitals resolved after 1 L fluid bolus  -Echo normal  -Random cortisol, drawn at 600, 7.1 (low normal)  -Follow-up with primary care provider, establish a [facility], 1 week.  Follow-up labs this upcoming Monday, including cortisol stimulation test.

## 2019-06-05 NOTE — H&P
Internal Medicine Admitting History and Physical    Note Author: Quin Roche M.D.       Name Garcia Hilton     1990   Age/Sex 28 y.o. male   MRN [id number]   Code Status FULL     After 5PM or if no immediate response to page, please call for cross-coverage  Attending/Team: Dr. Gross/Peter  See Patient List for primary contact information  Call [phone] to page    1st Call - Day Intern (R1):   Dr. Bowser 2nd Call - Day Sr. Resident (R2/R3):   Dr. Quintero        Chief Complaint:   Pre-syncope     HPI:  Mr. Hilton is a 28 year old male with no significant past medical history who presents with presyncope.  He states he has had multiple episodes of orthostatic hypotension which he describes as being worse upon standing up quickly with prodromal symptoms of dizziness, tunnel vision, diaphoresis, nausea.  He has had these symptoms for months however has noted them to be more frequent occurring 2-3 times in the last week.  He states last night was the first time he had the symptoms while laying down and also had a left-sided occipital headache dull in quality which was relieved with rest.  He has never lost consciousness.  Last night, he also had some productive cough of brown/green sputum, shortness of breath and night sweats.  Also describes some visual changes of images appearing even when he is not looking at them.  He has not been seen by an eye doctor.  He was seen by urgent care months ago where the diagnosed him with orthostatic hypotension and recommended increased fluid intake and getting up slowly.  He states he has been adhering to this recommendation and is unsure why he has had increase in his symptoms.  His fever/chills, joint pains, weight loss, myalgias, abdominal pain, photophobia, current headache, ear pain, urinary symptoms, bowel symptoms, facial pain.    He was seen at the urgent care where EKG was done and was sent to the ED for additional work-up.  In the emergency room, he  initially presented with an increased heart rate which returned to about 70s 80s on exam, otherwise vitals are stable.  CBC showed a elevated WBC count of 30 K.  No other significant lab abnormalities.  Chest x-ray was negative, influenza negative.      No personal or family history of blood disorders.  Monagamous with female partner x 2 years, states he uses condoms 100% of time. Patient reports STD testing in the past. Recent travel to [place] x 4 days, returned about 2 weeks ago. In March traveled to [employer], layover in [place]. No known sick contacts. No identifiable triggers to these events.   Takes a daily multiitamin, and ashwaganda for anxiety. Never smoker, social alcohol use, marijuana use occasionally, while in [place] used LSD and cocaine. No IV drug use      Review of Systems   Constitutional: Positive for diaphoresis. Negative for chills, fever, malaise/fatigue and weight loss.   HENT: Positive for congestion. Negative for ear discharge, ear pain, hearing loss, sinus pain, sore throat and tinnitus.    Eyes: Negative for blurred vision, double vision, pain, discharge and redness.   Respiratory: Positive for cough, sputum production and shortness of breath. Negative for hemoptysis and wheezing.    Cardiovascular: Negative for chest pain, palpitations, orthopnea and leg swelling.   Gastrointestinal: Negative for abdominal pain, blood in stool, constipation, diarrhea, heartburn, nausea and vomiting.   Genitourinary: Negative for dysuria, flank pain, frequency, hematuria and urgency.   Musculoskeletal: Negative for back pain, joint pain, myalgias and neck pain.   Skin: Negative for itching and rash.   Neurological: Positive for headaches. Negative for dizziness, tingling, tremors, sensory change, speech change, focal weakness, loss of consciousness and weakness.   Endo/Heme/Allergies: Negative for environmental allergies. Does not bruise/bleed easily.   Psychiatric/Behavioral: Negative for depression and  "suicidal ideas. The patient is not nervous/anxious.      Past Medical History (Chronic medical problem, known complications and current treatment)    History reviewed. No pertinent past medical history.    Past Surgical History:  History reviewed. No pertinent surgical history.    Current Outpatient Medications:  Home Medications     Reviewed by Corbin Green (Pharmacy Tech) on 06/04/19 at 2005  Med List Status: Complete   Medication Last Dose Status        Patient Bennie Taking any Medications                     Medication Allergy/Sensitivities:  Allergies   Allergen Reactions   • Dust Mite Extract      Trouble breathing   • Egg White [Albumin]      Swollen and itchiness   • Fish      Hives, trouble breathing     Family History (mandatory)   Family History   Problem Relation Age of Onset   • Hypertension Father    • Prostate cancer Paternal Grandmother        Social History (mandatory)   Social History     Social History   • Marital status: Single     Spouse name: N/A   • Number of children: N/A   • Years of education: N/A     Occupational History   • Not on file.     Social History Main Topics   • Smoking status: Never Smoker   • Smokeless tobacco: Never Used   • Alcohol use Yes      Comment: socially   • Drug use: Yes     Types: Marijuana      Comment: occasional, rare cocaine/LSD use    • Sexual activity: Not on file     Other Topics Concern   • Not on file     Social History Narrative   • No narrative on file     Living situation: Patient lives in [place], works as a cook   PCP : Pcp Pt States None    Physical Exam     Vitals:    06/04/19 1750 06/04/19 1934 06/04/19 2020 06/04/19 2314   BP: 105/78 134/76 127/68 120/57   Pulse: (!) 109 89  85   Resp: 16 16 16 20   Temp:   37.4 °C (99.4 °F) 37.3 °C (99.2 °F)   TempSrc:   Temporal Temporal   SpO2: 93% 95% 95% 96%   Weight:   66 kg (145 lb 8.1 oz)    Height:   1.727 m (5' 8\")      Body mass index is 22.12 kg/m².  /57   Pulse 85   Temp 37.3 °C (99.2 °F) " "(Temporal)   Resp 20   Ht 1.727 m (5' 8\")   Wt 66 kg (145 lb 8.1 oz)   SpO2 96%   BMI 22.12 kg/m²   O2 therapy: Pulse Oximetry: 96 %, O2 (LPM): 0, O2 Delivery: None (Room Air)    Physical Exam   Constitutional: He is oriented to person, place, and time and well-developed, well-nourished, and in no distress.   HENT:   Head: Normocephalic and atraumatic.   Mouth/Throat: Oropharynx is clear and moist.   (+) brownish material occluding right EAC  Left TM visualized, no abnormality   Mildly congested nasal turbinates   No oral lesions noted, no tonsillar hypertrophy or exudates    Eyes: Pupils are equal, round, and reactive to light. Conjunctivae and EOM are normal. Right eye exhibits no discharge. Left eye exhibits no discharge.   Neck: Normal range of motion. Neck supple.   Cardiovascular: Normal rate, regular rhythm, normal heart sounds and intact distal pulses.  Exam reveals no gallop and no friction rub.    No murmur heard.  Pulmonary/Chest: Effort normal and breath sounds normal. No respiratory distress. He has no wheezes. He has no rales.   Abdominal: Soft. Bowel sounds are normal. He exhibits no distension. There is no tenderness. There is no rebound and no guarding.   Genitourinary:   Genitourinary Comments: Rectal exam declined    Musculoskeletal: Normal range of motion. He exhibits no edema.   Lymphadenopathy:     He has no cervical adenopathy.   Neurological: He is alert and oriented to person, place, and time. He has normal sensation, normal strength, normal reflexes and intact cranial nerves. GCS score is 15.   (-) kernigs and brudzinski    Skin: Skin is warm and dry.   Erythematous plaque at the (L) ankle flexure surface, no other rashes noted    Psychiatric: Affect and judgment normal.   Vitals reviewed.    Data Review       Old Records Request:   Deferred  Current Records review/summary: Completed    Lab Data Review:  Recent Results (from the past 24 hour(s))   CBC WITH DIFFERENTIAL    Collection " Time: 06/04/19  3:17 PM   Result Value Ref Range    WBC 30.4 (HH) 4.8 - 10.8 K/uL    RBC 5.03 4.70 - 6.10 M/uL    Hemoglobin 15.6 14.0 - 18.0 g/dL    Hematocrit 45.3 42.0 - 52.0 %    MCV 90.1 81.4 - 97.8 fL    MCH 31.0 27.0 - 33.0 pg    MCHC 34.4 33.7 - 35.3 g/dL    RDW 39.8 35.9 - 50.0 fL    Platelet Count 221 164 - 446 K/uL    MPV 11.5 9.0 - 12.9 fL    Neutrophils-Polys 92.10 (H) 44.00 - 72.00 %    Lymphocytes 4.70 (L) 22.00 - 41.00 %    Monocytes 2.00 0.00 - 13.40 %    Eosinophils 0.10 0.00 - 6.90 %    Basophils 0.50 0.00 - 1.80 %    Immature Granulocytes 0.60 0.00 - 0.90 %    Nucleated RBC 0.00 /100 WBC    Neutrophils (Absolute) 28.13 (H) 1.82 - 7.42 K/uL    Lymphs (Absolute) 1.43 1.00 - 4.80 K/uL    Monos (Absolute) 0.62 0.00 - 0.85 K/uL    Eos (Absolute) 0.02 0.00 - 0.51 K/uL    Baso (Absolute) 0.15 (H) 0.00 - 0.12 K/uL    Immature Granulocytes (abs) 0.18 (H) 0.00 - 0.11 K/uL    NRBC (Absolute) 0.00 K/uL   COMP METABOLIC PANEL    Collection Time: 06/04/19  3:17 PM   Result Value Ref Range    Sodium 139 135 - 145 mmol/L    Potassium 4.2 3.6 - 5.5 mmol/L    Chloride 104 96 - 112 mmol/L    Co2 26 20 - 33 mmol/L    Anion Gap 9.0 0.0 - 11.9    Glucose 97 65 - 99 mg/dL    Bun 18 8 - 22 mg/dL    Creatinine 1.06 0.50 - 1.40 mg/dL    Calcium 9.9 8.5 - 10.5 mg/dL    AST(SGOT) 18 12 - 45 U/L    ALT(SGPT) 15 2 - 50 U/L    Alkaline Phosphatase 52 30 - 99 U/L    Total Bilirubin 0.8 0.1 - 1.5 mg/dL    Albumin 4.7 3.2 - 4.9 g/dL    Total Protein 7.7 6.0 - 8.2 g/dL    Globulin 3.0 1.9 - 3.5 g/dL    A-G Ratio 1.6 g/dL   ESTIMATED GFR    Collection Time: 06/04/19  3:17 PM   Result Value Ref Range    GFR If African American >60 >60 mL/min/1.73 m 2    GFR If Non African American >60 >60 mL/min/1.73 m 2   PERIPHERAL SMEAR REVIEW    Collection Time: 06/04/19  3:17 PM   Result Value Ref Range    Peripheral Smear Review see below    DIFFERENTIAL COMMENT    Collection Time: 06/04/19  3:17 PM   Result Value Ref Range    Comments-Diff  see below    MAGNESIUM    Collection Time: 19  3:17 PM   Result Value Ref Range    Magnesium 1.9 1.5 - 2.5 mg/dL   LACTIC ACID    Collection Time: 19  4:25 PM   Result Value Ref Range    Lactic Acid 1.3 0.5 - 2.0 mmol/L   Influenza A/B By PCR (Adult - Flu Only)    Collection Time: 19  7:40 PM   Result Value Ref Range    Influenza virus A RNA Negative Negative    Influenza virus B, PCR Negative Negative   EKG    Collection Time: 19  9:40 PM   Result Value Ref Range    Report       [facility]    Test Date:  2019  Pt Name:    LARRY IGLESIAS               Department: ER  MRN:        [id number]                      Room:       T215  Gender:     Male                         Technician: JAN  :        1990                   Requested By:DELPHINE CHAVEZ  Order #:    [id number]                    Reading MD: Pam Munoz MD    Measurements  Intervals                                Axis  Rate:       87                           P:          64  MT:         157                          QRS:        -47  QRSD:       87                           T:          62  QT:         343  QTc:        413    Interpretive Statements  SINUS RHYTHM  LAD, CONSIDER LEFT ANTERIOR FASCICULAR BLOCK  No previous ECG available for comparison    Electronically Signed On 2019 0:13:17 PDT by Pam Munoz MD     URINALYSIS,CULTURE IF INDICATED    Collection Time: 19 10:15 PM   Result Value Ref Range    Color Yellow     Character Clear     Specific Gravity 1.017 <1.035    Ph 6.5 5.0 - 8.0    Glucose Negative Negative mg/dL    Ketones Trace (A) Negative mg/dL    Protein Negative Negative mg/dL    Bilirubin Negative Negative    Urobilinogen, Urine 0.2 Negative    Nitrite Negative Negative    Leukocyte Esterase Negative Negative    Occult Blood Negative Negative    Micro Urine Req see below      Imaging/Procedures Review:    Independant Imaging Review: Completed     DX-CHEST-PORTABLE (1 VIEW)   Final  Result      No evidence of acute cardiopulmonary process.      EC-ECHOCARDIOGRAM COMPLETE W/O CONT    (Results Pending)     EKG:   EKG Independent Review: Completed  QTc:413, HR: 87, Normal Sinus Rhythm, no ST/T changes    Records reviewed and summarized in current documentation :  Yes         Assessment/Plan     * Leukocytosis- (present on admission)   Assessment & Plan    Unclear etiology   Isolated episode of tachycardia on ED arrival, no other SIRS criteria, no identifiable focus of infection   Flu negative  Patient seemingly asymptomatic, unclear if this was an incidental finding or related to pre-syncope  -Echo tomorrow   -Blood and urine cultures   -For ESR/CRP, LAP score   -1g ceftriaxone given   -Consider PHL c-some if no response to antibiotics   -Monitor CBC, if patient remains stable may require additional workup as outpatient      Pre-syncope- (present on admission)   Assessment & Plan    Multiple episodes of almost passing out accompanied with nausea, diaphoresis, visual changes; happens when he stands quickly most of the time but last night occurred while laying down.   EKG was unrevealing   -Tele   -Orthostatic vitals   -Fluids   -Echo for tomorrow        Anticipated Hospital stay: Observation admit    Quality Measures  Quality-Core Measures   Reviewed items::  Labs reviewed, Medications reviewed, Radiology images reviewed and EKG reviewed  Day catheter::  No Day  DVT prophylaxis pharmacological::  Enoxaparin (Lovenox)  Ulcer Prophylaxis::  Not indicated    PCP: Pcp Pt States None

## 2019-06-05 NOTE — ED NOTES
Med rec updated and complete. Allergies reviewed. Pt denies oral antibiotic use in last 30 days. Pt takes no medications.

## 2019-06-05 NOTE — PROGRESS NOTES
Assessment completed.  Pt A&Ox4, bilat  strong and equal, no drift.  No facial droop or slurred speech.  Pt denies any numbness/tingling at this time.  Pt denies any dizziness/lightheadedness.  Respirations even, unlabored on room air.  Pt denies any pain at this time.  Sinus rhythm noted on telemetry monitor.  IVF infusing per MAR.  POC discussed; communication board updated.  Call light and belongings within reach. Needs met, will continue to monitor.

## 2019-06-05 NOTE — CARE PLAN
Problem: Safety  Goal: Will remain free from falls  Outcome: PROGRESSING AS EXPECTED  Pt educated on safety precautions, utilization of the call light, and bed alarm.  Pt verbalized understanding.    Problem: Infection  Goal: Will remain free from infection  Outcome: PROGRESSING AS EXPECTED  Implement standard precautions and perform handwashing before and after patient contact. RN will follow protocols and necessary steps to minimize the spread of infection.  RN educated pt and any visitors on proper hand hygiene.

## 2019-06-05 NOTE — PROGRESS NOTES
SENIOR ADMIT NOTE:    Mr. Hilton is a 28M with PMHx of recurrent episodes of orthostatic hypotension over last several months who one night PTA experienced subjective fevers, diaphoresis, chills, nausea without vomiting, myalgias/neck stiffness, headache, productive cough, night sweats, dizziness/lightheadedness and presyncope (this time while lying in bed rather than with rising as usual). He denied vomiting, diarrhea, abdominal pain, dysuria, hemoptysis, facial pain or pain with eye movement, confusion, joint pains, falls. At this time all of the symptoms he had last night have resolved. He reports that he is in a two year monogamous relationship with a female partner and uses condoms always and consistently. He reports no sick contacts but does report recent travel to the [place] two weeks ago during which time he used cocaine and LSD, though he states recreational drug use other than marijuana is a very rare occurrence for him. He did not get any vaccinations prior to this trip and does not remember getting bitten by mosquitoes or other insects while there. Additionally in late March he was on an airport layover in [place] for about 24 hours and also visited [place]. He again reports no vaccinations prior to that trip.    ED workup notable for CBC with WBC 30.4 and normal Hgb and plt; unremarkable CMP; normal lactic (1.3). Blood cx x2 sent. CXR without e/o acute process.    P/E:  Non-toxic appearing, alert and oriented, healthy appearing young male in NAD. Kernig and Brudzinski negative. Full ROM of neck and without meningismus. CV: RRR without M/R/G. Pulm: CTAB without adventitious. Abd: soft, NT/ND, no rebound or guarding. No peripheral edema.      A/P:  # Positive SIRS criteria   -without clear infectious source and no current symptoms   -although VS have overall been relatively stable, had single episode of tachycardia to 109 in ED. No fever noted since arrival and tachycardia resolved on all checks  since.   -given significant leukocytosis and single episode tachy meets SIRS criteria; however there are currently no focalizing symptoms and exam is quite unrevealing.   -although he had HA and some myalgias including some neck stiffness last night, he currently has no HA, meningismus, encephalopathy, and Kernig and Brudzinski testing are negative. Suspicion for meningitis very low. Discussed case with Dr. Duncan (who had also discussed with EDP) and do not feel LP is warranted at this time; however, would consider this if all other workup is negative, symptoms return or worsen, and leukocytosis not improving.   -CXR clear. Flu negative.    -TTE, UA, and blood cx x2 pending. Day team to follow up.    -although short term symptom chronicity implicates an infectious problem, WBC highly elevated and hematological disorder/malignancy also on Ddx; smear ordered for morning, though day team will likely need to call heme lab to ensure blood is actually assessed   -gentle IVF for now; not giving sepsis bolus given he is not hypotensive at this time   -will wait for UA to be collected then give 1g Ceftriaxone to cover for possible UTI if this represents sepsis secondary to (asymptomatic) UTI; day team may be able to focus abx based on further lab results including blood cx's.   -day team to consider workup for tropical diseases including ?malaria, typhoid, particularly if symptoms ongoing.

## 2019-06-05 NOTE — PROGRESS NOTES
Report received from IVANNA Liu.  Patient picked up in the [facility] ED via wheelchair with an ACLS RN and a monitor.  Patient A & O x 4.  No apparent signs of distress or complaints of pain.  Safety precautions in place.  Patient educated to call for assistance.  Will continue to monitor.

## 2019-06-05 NOTE — ED NOTES
Patient awake alert and oriented x 4, [place] 15, bed in low position, call light within reach, on room air, attached to cardiac monitor, unlabored breathing noted, no cough noted, interacts with staff, interactions noted as appropriate, watching television.

## 2019-06-05 NOTE — PROGRESS NOTES
Internal Medicine Interval Note  Note Author: Kraig Bowser M.D.     Name Garcia Hilton     1990   Age/Sex 28 y.o. male   MRN [id number]   Code Status Full Code     After 5PM or if no immediate response to page, please call for cross-coverage  Attending/Team: Peter See Patient List for primary contact information  Call [phone] to page    1st Call - Day Intern (R1):   Mague 2nd Call - Day Sr. Resident (R2/R3):   Leon         Reason for interval visit  (Principal Problem)   Leukocytosis    HPI: 28 y.o. male,Otherwise healthy without significant past medical history, recurrent presyncopal symptoms (sudden onset blurred vision/lightheadedness/dizziness/occipital headache/diaphoresis) occurring 3 times in the past month.  Incidentally noted to have white blood cell count of 30.4.  Patient does not meet sepsis criteria, did receive 1 g of ceftriaxone in emergency department.  Repeat white blood cell count the same day 13.5.  Suspect laboratory error.  He does report a recent history of trip to the [place] for 4 days and use of LSD and cocaine during that time, 2 weeks ago.    Current problems and interval status  Patient has no complaints.  He has not had presyncopal events since admission.  He denies pain.  He is amenable to discharge with outpatient work-up.  EKG and monitor demonstrates sinus rhythm/sinus bradycardia in the high 50s.  Negative for orthostasis.  Patient did receive fluid resuscitation.    Review of Systems:   Constitutional: Negative for chills and fever.   Respiratory: Negative for cough and shortness of breath.    Cardiovascular: Negative for chest pain, palpitations and leg swelling.   Gastrointestinal: Negative for abdominal pain, constipation, diarrhea, nausea and vomiting.   Neurological: Negative for dizziness, focal weakness and headaches.  Negative for back pain.  Negative for incontinence/numbness/pain in the lower extremities.  Negative for focal  weakness.    Consultants/Specialty  None  PCP: Pcp Pt States None    Disposition  Inpatient for presyncope.      PMHx:  History reviewed. No pertinent past medical history.    PSHx:  History reviewed. No pertinent surgical history.    Medications:    Current Facility-Administered Medications:   •  senna-docusate (PERICOLACE or SENOKOT S) 8.6-50 MG per tablet 2 Tab, 2 Tab, Oral, BID **AND** polyethylene glycol/lytes (MIRALAX) PACKET 1 Packet, 1 Packet, Oral, QDAY PRN **AND** magnesium hydroxide (MILK OF MAGNESIA) suspension 30 mL, 30 mL, Oral, QDAY PRN **AND** bisacodyl (DULCOLAX) suppository 10 mg, 10 mg, Rectal, QDAY PRN, Quin Roche M.D.  •  NS infusion, , Intravenous, Continuous, Quin Roche M.D., Last Rate: 83 mL/hr at 06/04/19 1948  •  enoxaparin (LOVENOX) inj 40 mg, 40 mg, Subcutaneous, DAILY, Quin Roche M.D.  •  acetaminophen (TYLENOL) tablet 650 mg, 650 mg, Oral, Q6HRS PRN, Quin Roche M.D.  •  labetalol (NORMODYNE,TRANDATE) injection 10 mg, 10 mg, Intravenous, Q4HRS PRN, Quin Roche M.D.  •  guaiFENesin dextromethorphan (ROBITUSSIN DM) 100-10 MG/5ML syrup 10 mL, 10 mL, Oral, Q6HRS PRN, Quin Roche M.D.  •  cefTRIAXone (ROCEPHIN) 1 g in  mL IVPB, 1 g, Intravenous, Q24HRS, Quin Roche M.D., Stopped at 06/05/19 0637    Allergies:  Allergies   Allergen Reactions   • Dust Mite Extract      Trouble breathing   • Egg White [Albumin]      Swollen and itchiness   • Fish      Hives, trouble breathing       Quality Measures  Reviewed items:  EKG reviewed, Labs reviewed, Medications reviewed and Radiology images reviewed  Day catheter:  No Day  Central line: No central line  DVT prophylaxis:  Yes            Physical Exam       Vitals:    06/05/19 0429 06/05/19 0505 06/05/19 0506 06/05/19 0507   BP: 101/55 103/56 109/66 111/64   Pulse: 63 91 91 77   Resp: 20 20 20 20   Temp: 36.9 °C (98.5 °F)      TempSrc: Temporal      SpO2: 96% 95% 96% 96%   Weight:       Height:          Body mass index is 22.12 kg/m². Weight: 66 kg (145 lb 8.1 oz)  Oxygen Therapy:  Pulse Oximetry: 96 %, O2 (LPM): 0, O2 Delivery: None (Room Air)    PHYSICAL EXAM:  Constitutional: Well-developed, well-nourished, and in no distress.   HENT:   Neck: Normal range of motion. Neck supple. No JVD present. No tracheal deviation present.   Cardiovascular: Regular rhythm.  Exam reveals no gallop and no friction rub.    No murmur heard.  Pulmonary/Chest: Effort normal and breath sounds normal. No stridor. No respiratory distress. No wheezes. No rales. No tenderness.   Abdominal: Soft. Bowel sounds are normal. No distension and no mass. There is no tenderness. There is no rebound and no guarding.    Neurological: Alert and oriented to person, place, and time. No cranial nerve deficit.   Vitals reviewed.      Lab Data Review:         6/5/2019  7:24 AM    Recent Labs      06/04/19   1517 06/05/19   0600   SODIUM  139  142   POTASSIUM  4.2  4.0   CHLORIDE  104  111   CO2  26  24   BUN  18  20   CREATININE  1.06  1.00   MAGNESIUM  1.9   --    CALCIUM  9.9  9.1       Recent Labs      06/04/19   1517  06/05/19   0600   ALTSGPT  15  11   ASTSGOT  18  17   ALKPHOSPHAT  52  41   TBILIRUBIN  0.8  0.5   GLUCOSE  97  99       Recent Labs      06/04/19   1517  06/05/19   0600   RBC  5.03  4.29*   HEMOGLOBIN  15.6  13.5*   HEMATOCRIT  45.3  39.8*   PLATELETCT  221  186       Recent Labs      06/04/19 1517  06/05/19   0600   WBC  30.4*  13.5*   NEUTSPOLYS  92.10*  81.10*   LYMPHOCYTES  4.70*  13.20*   MONOCYTES  2.00  3.60   EOSINOPHILS  0.10  1.40   BASOPHILS  0.50  0.40   ASTSGOT  18  17   ALTSGPT  15  11   ALKPHOSPHAT  52  41   TBILIRUBIN  0.8  0.5           Assessment/Plan     Pre-syncope  Multiple episodes of almost passing out accompanied with nausea, diaphoresis, visual changes; happens when he stands quickly most of the time but last night occurred while laying down.  Not associated with vertigo.  Echo was normal.  Patient had  orthostatic tachycardia without hypotension, not meeting the criteria for postural orthostatic hypotension (greater than 40 bpm increase)  EKG was unrevealing   -Tele: Sinus rhythm/sinus bradycardia in high 50s (age-appropriate)  -Repeat orthostatic vitals after 1 L fluid bolus  -Continue fluid bolus as needed  -Random cortisol pending    Leukocytosis  Suspect laboratory error, repeat white blood cell count the same day declined from 30.5-13.5.  Patient does have mild leukocyte ptosis, unclear etiology, suspect stress demargination.  Afebrile, not tachycardic or tachypneic  Isolated episode of tachycardia on ED arrival, no other SIRS criteria, no identifiable focus of infection   Flu negative  Suspect this was an incidental finding or related to pre-syncope  -Echo pending  -Blood and urine cultures no growth to date  -ESR, CRP normal.  UA bland.  -1g ceftriaxone given upon admission  -Lymphoma/leukemia is unlikely with a high normal white blood cell count.  Differential suggests reactive.  -Anticipate discharge with additional workup as outpatient

## 2019-06-05 NOTE — ASSESSMENT & PLAN NOTE
Multiple episodes of almost passing out accompanied with nausea, diaphoresis, visual changes; happens when he stands quickly most of the time but last night occurred while laying down.  Not associated with vertigo.  Echo was normal.  Patient had orthostatic tachycardia without hypotension, not meeting the criteria for postural orthostatic hypotension (greater than 40 bpm increase)  EKG was unrevealing   -Tele: Sinus rhythm/sinus bradycardia in high 50s (age-appropriate)

## 2019-06-05 NOTE — DISCHARGE PLANNING
Care Transition Team Assessment    Spoke with patient at bedside. Anticipate no needs @ present time. Drove self here and will drive self home @ D/C.    Information Source  Orientation : Oriented x 4  Information Given By: Patient  Informant's Name: Garcia Hilton    Readmission Evaluation  Is this a readmission?: No    Interdisciplinary Discharge Planning  Does Admitting Nurse Feel This Could be a Complex Discharge?: No  Primary Care Physician: Has appointment with [facility] 08/2019  Lives with - Patient's Self Care Capacity: Significant Other  Patient or legal guardian wants to designate a caregiver (see row info): No  Support Systems: Spouse / Significant Other  Housing / Facility: 1 Story Apartment / Condo  Do You Take your Prescribed Medications Regularly: No  Reasons Why Not Taking Medications :  (No PCP)  Able to Return to Previous ADL's: Yes  Mobility Issues: No  Prior Services: Home-Independent  Patient Expects to be Discharged to:: Home  Assistance Needed: No  Durable Medical Equipment: Not Applicable    Discharge Preparedness  What are your discharge supports?: Spouse  Prior Functional Level: Ambulatory    Functional Assesment  Prior Functional Level: Ambulatory    Finances  Prescription Coverage: Yes    Anticipated Discharge Information  Anticipated discharge disposition: Home  Discharge Address: [address] [place] [address]  Discharge Contact Phone Number: [phone]

## 2019-06-06 LAB — LAP WBC-CCNC: 176 (ref 22–124)

## 2019-06-09 LAB
BACTERIA BLD CULT: NORMAL
SIGNIFICANT IND 70042: NORMAL
SITE SITE: NORMAL
SOURCE SOURCE: NORMAL

## 2019-06-10 ENCOUNTER — TELEPHONE (OUTPATIENT)
Dept: MEDICAL GROUP | Facility: MEDICAL CENTER | Age: 29
End: 2019-06-10

## 2019-06-10 ENCOUNTER — HOSPITAL ENCOUNTER (OUTPATIENT)
Dept: LAB | Facility: MEDICAL CENTER | Age: 29
End: 2019-06-10
Attending: STUDENT IN AN ORGANIZED HEALTH CARE EDUCATION/TRAINING PROGRAM
Payer: COMMERCIAL

## 2019-06-10 DIAGNOSIS — R55 PRE-SYNCOPE: ICD-10-CM

## 2019-06-10 NOTE — TELEPHONE ENCOUNTER
Patient called  for Dr. Brown / D/C program, asking if he needed to complete these labs prior to visit with Dr. Brown. It looks like Angelica GOODMAN, has already ordered this. As to if it is required, I asked pt to call infusion center tomorrow morning and see if they can get it done. Dr. Brown is not in the office and I am not able to determine this, so I told patient he should still come even if he isn't able to get the lab done and Dr. Brown can assess on Wednesday.

## 2019-06-10 NOTE — TELEPHONE ENCOUNTER
1. Caller Name: Pt                      Call Back Number: [phone] (home)     2. Message: Patient walked in , He was seen in ER where a  CORTISOL STIMULATION X3 SPECIMENS   Was ordered. Per infusion pt's PCP needs to  Order test. Pt is scheduled to establish with you. Please advise   3. Patient approves office to leave a detailed voicemail/MyChart message: yes

## 2019-06-12 ENCOUNTER — OFFICE VISIT (OUTPATIENT)
Dept: MEDICAL GROUP | Facility: MEDICAL CENTER | Age: 29
End: 2019-06-12
Payer: COMMERCIAL

## 2019-06-12 VITALS
BODY MASS INDEX: 22.13 KG/M2 | DIASTOLIC BLOOD PRESSURE: 66 MMHG | HEIGHT: 68 IN | HEART RATE: 60 BPM | TEMPERATURE: 98.3 F | OXYGEN SATURATION: 99 % | RESPIRATION RATE: 14 BRPM | WEIGHT: 146 LBS | SYSTOLIC BLOOD PRESSURE: 100 MMHG

## 2019-06-12 DIAGNOSIS — L40.9 PSORIASIS: ICD-10-CM

## 2019-06-12 DIAGNOSIS — D72.828 OTHER ELEVATED WHITE BLOOD CELL (WBC) COUNT: ICD-10-CM

## 2019-06-12 DIAGNOSIS — D22.9 NUMEROUS SKIN MOLES: ICD-10-CM

## 2019-06-12 DIAGNOSIS — F41.9 ANXIETY AND DEPRESSION: ICD-10-CM

## 2019-06-12 DIAGNOSIS — F32.A ANXIETY AND DEPRESSION: ICD-10-CM

## 2019-06-12 DIAGNOSIS — R55 PRE-SYNCOPE: ICD-10-CM

## 2019-06-12 DIAGNOSIS — Z09 HOSPITAL DISCHARGE FOLLOW-UP: ICD-10-CM

## 2019-06-12 DIAGNOSIS — D72.823 LEUKEMOID REACTION: ICD-10-CM

## 2019-06-12 DIAGNOSIS — J30.2 SEASONAL ALLERGIC RHINITIS, UNSPECIFIED TRIGGER: ICD-10-CM

## 2019-06-12 PROCEDURE — 99204 OFFICE O/P NEW MOD 45 MIN: CPT | Performed by: FAMILY MEDICINE

## 2019-06-12 ASSESSMENT — PATIENT HEALTH QUESTIONNAIRE - PHQ9
5. POOR APPETITE OR OVEREATING: 0 - NOT AT ALL
SUM OF ALL RESPONSES TO PHQ QUESTIONS 1-9: 3
CLINICAL INTERPRETATION OF PHQ2 SCORE: 1

## 2019-06-12 NOTE — PATIENT INSTRUCTIONS
If you need further assistance, or have any questions; concerns or lingering symptoms before seeing your Primary Care Provider or specialist.     Do not hesitate to contact us.     Please contact us at the Post-Hospital Follow Up Program at [phone] and ask for the Medical Assistant for Dr Brown.   Our offices hours are Monday-Friday 8 am-5 pm.

## 2019-06-12 NOTE — PROGRESS NOTES
Subjective:     Garcia Hilton is a 28 y.o. male who presents for Hospital Follow-up.  Chart and discharge summary reviewed.   Date of discharge 6/5/19.  48- hour post discharge RN call not completed.       HPI: Recently hospitalized for recurrent presyncopal episodes.  His symptoms include blurred vision with lightheadedness, diaphoresis, headaches.  This occurred 3 times within the past month.  He recently traveled to [place] [facility] [place] but he says he had these episodes prior to his trip and no episodes during the trip.  His initial white blood cell count was quite elevated at 30.4 but repeat test on the same day showed it to be 13.5.  Results of leukocyte alkaline phosphatase score indicates leukemoid reaction.  The patient was treated with IV fluids and noted to have some sinus bradycardia without arrhythmia.  A.m. serum cortisol level was noted to be in the low normal range at 7.9.  A cortisol stimulation test was ordered for the patient to get after discharge but the patient has been unable to schedule this because it needed to be ordered by an outpatient physician and scheduled at the infusion center.  He contacted Angelica Alatorre who he is scheduled to see in August.  She ordered the test but not on the appropriate infusion center form.  Patient was noted to have anxiety.  He feels its more combination of anxiety and depression.  He scored a 3 on PHQ 9 depression screening today, which indicates minimal depression.    Since returning home, patient reports feeling good except for seasonal nasal congestion and mucus.  He says he has lived in [place] for about a year and a half [place].  He has been using a [place] pot about once a week and sometimes Claritin he would like to see an allergist.     Patient notes history of psoriasis and some moles on his back.  He would like to see a dermatologist.    The patient has questions regarding hospitalization or discharge:  "All of his questions are answered.  The patient denies weakness; denies difficulty taking care of self at home.  He is on no regular medications.  He smokes marijuana about once a week.  Denies alcohol abuse.  He denies cigarette use.    No current outpatient prescriptions on file.     No current facility-administered medications for this visit.        Allergies:   Dust mite extract; Egg white [albumin]; and Fish    Social History:  Social History   Substance Use Topics   • Smoking status: Never Smoker   • Smokeless tobacco: Never Used   • Alcohol use Yes      Comment: socially        Family History:  Family History   Problem Relation Age of Onset   • Hypertension Father    • Prostate cancer Paternal Grandmother        History reviewed. No pertinent past medical history.    Surgical History  History reviewed. No pertinent surgical history.    ROS:    Constitutional:  Denies fever, chills, night sweats, fatigue or malaise  HENT: Denies head congestion, ear pain or drainage, decreased hearing, sore throat.  He has nasal congestion and some postnasal drip.  Eyes: Denies visual changes, eye drainage or redness, eye pain  Neck: Denies pain, swollen glands, decreased range of motion  Lungs: Denies shortness of breath, wheezing, cough  Cardiovascular: Denies chest pain, orthopnea, lower extremity edema, palpitations  Abdominal: Denies abdominal pain, change in bowel or bladder habits, nausea or vomiting  Musculoskeletal: Denies back pain, joint pains, decreased range of motion  Endocrine: Denies unexplained weight changes, heat or cold intolerance, hair loss, polyuria or polydipsia  Neurological: Denies dizziness, headache, confusion, focal weakness or numbness, memory loss  Psychiatric: He notes some anxiety and depression.       Objective:     /66 (BP Location: Left arm, Patient Position: Sitting, BP Cuff Size: Adult)   Pulse 60   Temp 36.8 °C (98.3 °F) (Temporal)   Resp 14   Ht 1.727 m (5' 8\")   Wt 66.2 kg " (146 lb)   SpO2 99%      Physical Exam:    GEN:  Alert, oriented, in no distress  HEENT:  PERRLA, EOMI, TM's clear bilaterally, oropharynx clear without erythema or exudates.  Nasal passages show swollen mucosa, more on the right than on the left.  NECK;  Supple without cervical adenopathy or thyromegaly.    LUNGS:  Clear to auscultation without rales, rhonci, or wheezes.  CV:  Heart RRR without murmurs or S3 or S4  EXT:  Without cyanosis, clubbing, or edema.  NEURO:  Cranial nerves II through XII intact.  Motor function and sensation intact.  SKIN: Multiple small moles scattered on his back.  Dry plaques on lower extremities.  PSYCH:  Behavior is appropriate.      Assessment and Plan:     Hospital follow-up:   Hospitalization and results reviewed with patient. High risk conditions requiring teaching or care coordination were identified and addressed.The patient demonstrate understanding of admission and underlying conditions. The patient understands discharge instructions and when to seek medical attention. Medications reviewed including instructions regarding high risk medications, dosing and side effects.    The patient is able to safely adhere to ADL/IADL, treatment and medication regimen, self-manage of high-risk conditions? Yes  The patient requires physical therapy/home health/DME referral? No   The patient requires referral to care coordination/behavioral health/social work?  Yes  Patient requires referral for pharmacy consult? No       2. Pre-syncope  - HOLTER - Cardiology Performed (48HR); Future  -I reordered the cortisol stimulation test on the required form and faxed to the infusion center.  They will need to obtain an authorization from his insurance company prior to scheduling the test.    3. Leukemoid reaction  -Repeat CBC WITH DIFFERENTIAL; Future    4. Other elevated white blood cell (WBC) count  - Repeat CBC WITH DIFFERENTIAL; Future    5. Anxiety and depression  - REFERRAL TO BEHAVIORAL  HEALTH    6. Seasonal allergic rhinitis, unspecified trigger  -Recommended using [place] pot on a daily basis.  Also recommended Flonase nasal spray  - REFERRAL TO ALLERGY    7. Psoriasis  - REFERRAL TO DERMATOLOGY    8. Numerous skin moles  - REFERRAL TO DERMATOLOGY        Medication Reconciliation  Medication list at end of encounter:   No current outpatient prescriptions on file.     No current facility-administered medications for this visit.        Primary care follow-up:    Recommended followup:  With new Pcp   Future Appointments       Provider Department Center    8/5/2019 9:20 AM [facility]          Patient Instruction  Patient provided with educational material on discharge diagnosis and management of symptoms/red flags. Patient instructed to keep follow-up appointments and to bring written questions and and actual medications to each office visit. Patient instructed to call PCP/specialist with any problems/questions/concerns. Patient verbalizes understanding and has no further questions at this time.    Total of 45 minutes face-to-face time was spent with patient, with greater than 50% of the time spent in counseling regarding the possible causes of his recurrent symptoms, and coordination of care with ordering of the cortisol stimulation test.

## 2019-06-13 LAB
BACTERIA BLD CULT: NORMAL
SIGNIFICANT IND 70042: NORMAL
SITE SITE: NORMAL
SOURCE SOURCE: NORMAL

## 2019-06-20 ENCOUNTER — NON-PROVIDER VISIT (OUTPATIENT)
Dept: CARDIOLOGY | Facility: MEDICAL CENTER | Age: 29
End: 2019-06-20
Payer: COMMERCIAL

## 2019-06-20 DIAGNOSIS — R55 PRE-SYNCOPE: ICD-10-CM

## 2019-06-20 DIAGNOSIS — R00.1 SINUS BRADYCARDIA: ICD-10-CM

## 2019-06-20 PROCEDURE — 93224 XTRNL ECG REC UP TO 48 HRS: CPT | Performed by: INTERNAL MEDICINE

## 2019-06-21 ENCOUNTER — OUTPATIENT INFUSION SERVICES (OUTPATIENT)
Dept: ONCOLOGY | Facility: MEDICAL CENTER | Age: 29
End: 2019-06-21
Attending: FAMILY MEDICINE
Payer: COMMERCIAL

## 2019-06-21 VITALS
HEART RATE: 77 BPM | TEMPERATURE: 97.9 F | SYSTOLIC BLOOD PRESSURE: 125 MMHG | DIASTOLIC BLOOD PRESSURE: 59 MMHG | WEIGHT: 148.15 LBS | HEIGHT: 68 IN | OXYGEN SATURATION: 100 % | RESPIRATION RATE: 18 BRPM | BODY MASS INDEX: 22.45 KG/M2

## 2019-06-21 DIAGNOSIS — E27.1 PRIMARY ADRENAL INSUFFICIENCY (HCC): ICD-10-CM

## 2019-06-21 DIAGNOSIS — R55 PRE-SYNCOPE: ICD-10-CM

## 2019-06-21 DIAGNOSIS — D72.828 OTHER ELEVATED WHITE BLOOD CELL (WBC) COUNT: ICD-10-CM

## 2019-06-21 DIAGNOSIS — D72.823 LEUKEMOID REACTION: ICD-10-CM

## 2019-06-21 LAB
BASOPHILS # BLD AUTO: 0.6 % (ref 0–1.8)
BASOPHILS # BLD: 0.04 K/UL (ref 0–0.12)
CORTIS SERPL-MCNC: 11.9 UG/DL (ref 0–23)
CORTIS SERPL-MCNC: 21.1 UG/DL (ref 0–23)
CORTIS SERPL-MCNC: 22.2 UG/DL (ref 0–23)
EOSINOPHIL # BLD AUTO: 0.11 K/UL (ref 0–0.51)
EOSINOPHIL NFR BLD: 1.7 % (ref 0–6.9)
ERYTHROCYTE [DISTWIDTH] IN BLOOD BY AUTOMATED COUNT: 40.2 FL (ref 35.9–50)
HCT VFR BLD AUTO: 42.6 % (ref 42–52)
HGB BLD-MCNC: 14.1 G/DL (ref 14–18)
IMM GRANULOCYTES # BLD AUTO: 0.03 K/UL (ref 0–0.11)
IMM GRANULOCYTES NFR BLD AUTO: 0.5 % (ref 0–0.9)
LYMPHOCYTES # BLD AUTO: 1.73 K/UL (ref 1–4.8)
LYMPHOCYTES NFR BLD: 26.1 % (ref 22–41)
MCH RBC QN AUTO: 30.4 PG (ref 27–33)
MCHC RBC AUTO-ENTMCNC: 33.1 G/DL (ref 33.7–35.3)
MCV RBC AUTO: 91.8 FL (ref 81.4–97.8)
MONOCYTES # BLD AUTO: 0.59 K/UL (ref 0–0.85)
MONOCYTES NFR BLD AUTO: 8.9 % (ref 0–13.4)
NEUTROPHILS # BLD AUTO: 4.12 K/UL (ref 1.82–7.42)
NEUTROPHILS NFR BLD: 62.2 % (ref 44–72)
NRBC # BLD AUTO: 0 K/UL
NRBC BLD-RTO: 0 /100 WBC
PLATELET # BLD AUTO: 222 K/UL (ref 164–446)
PMV BLD AUTO: 11.7 FL (ref 9–12.9)
RBC # BLD AUTO: 4.64 M/UL (ref 4.7–6.1)
WBC # BLD AUTO: 6.6 K/UL (ref 4.8–10.8)

## 2019-06-21 PROCEDURE — 85025 COMPLETE CBC W/AUTO DIFF WBC: CPT

## 2019-06-21 PROCEDURE — 82533 TOTAL CORTISOL: CPT

## 2019-06-21 PROCEDURE — 306780 HCHG STAT FOR TRANSFUSION PER CASE

## 2019-06-21 PROCEDURE — 36415 COLL VENOUS BLD VENIPUNCTURE: CPT

## 2019-06-21 PROCEDURE — 96374 THER/PROPH/DIAG INJ IV PUSH: CPT

## 2019-06-21 PROCEDURE — 700111 HCHG RX REV CODE 636 W/ 250 OVERRIDE (IP): Performed by: FAMILY MEDICINE

## 2019-06-21 RX ORDER — COSYNTROPIN 0.25 MG/ML
0.25 INJECTION, POWDER, FOR SOLUTION INTRAMUSCULAR; INTRAVENOUS ONCE
Status: COMPLETED | OUTPATIENT
Start: 2019-06-21 | End: 2019-06-21

## 2019-06-21 RX ADMIN — COSYNTROPIN 250 MCG: 0.25 INJECTION, POWDER, LYOPHILIZED, FOR SOLUTION INTRAVENOUS at 11:30

## 2019-06-21 NOTE — PROGRESS NOTES
Pt presented to infusion center for Stim Test. Educated on POC, pt agreeable. PIV started, brisk blood return observed. Baseline labs drawn as ordered. Cosyntropin given per orders. Cortisol levels drawn at 30 mins and 60 mins. PIV flushed and removed, gauze drsg placed. No further appts needed at this time. Left on foot in NAD and great spirits.

## 2019-06-25 ENCOUNTER — PATIENT MESSAGE (OUTPATIENT)
Dept: FAMILY PLANNING/WOMEN'S HEALTH CLINIC | Facility: OTHER | Age: 29
End: 2019-06-25

## 2019-06-25 LAB — EKG IMPRESSION: NORMAL

## 2019-06-30 DIAGNOSIS — R94.31 ABNORMAL HOLTER MONITOR FINDING: ICD-10-CM

## 2019-06-30 DIAGNOSIS — R55 PRE-SYNCOPE: ICD-10-CM

## 2019-08-05 ENCOUNTER — OFFICE VISIT (OUTPATIENT)
Dept: MEDICAL GROUP | Facility: MEDICAL CENTER | Age: 29
End: 2019-08-05
Payer: COMMERCIAL

## 2019-08-05 VITALS
OXYGEN SATURATION: 98 % | HEART RATE: 71 BPM | WEIGHT: 150 LBS | DIASTOLIC BLOOD PRESSURE: 72 MMHG | BODY MASS INDEX: 22.73 KG/M2 | SYSTOLIC BLOOD PRESSURE: 116 MMHG | TEMPERATURE: 98 F | HEIGHT: 68 IN

## 2019-08-05 DIAGNOSIS — R55 PRE-SYNCOPE: ICD-10-CM

## 2019-08-05 PROCEDURE — 99213 OFFICE O/P EST LOW 20 MIN: CPT | Performed by: NURSE PRACTITIONER

## 2019-08-05 NOTE — ASSESSMENT & PLAN NOTE
Had been having intermittent episodes since last November. Was seen in ER for recurrent near syncope, dizziness, low BP, fatigue. WBCs were over 30,000. BP was 90s/40s. Cortisol stim tests were abnormal, patient was referred to endocrinology, has appointment this month. Has not had an episode since ER visit.    Had a holter monitor done with some abnormalities, has follow up with cardiology 8/23.

## 2019-08-05 NOTE — PROGRESS NOTES
"Garcia Hilton is a 28 y.o. male here to establish care:  HPI:    Pre-syncope  Had been having intermittent episodes since last November. Was seen in ER for recurrent near syncope, dizziness, low BP, fatigue. WBCs were over 30,000. BP was 90s/40s. Cortisol stim tests were abnormal, patient was referred to endocrinology, has appointment this month. Has not had an episode since ER visit.    Had a holter monitor done with some abnormalities, has follow up with cardiology 8/23.     Current medicines (including changes today)  No current outpatient medications on file.     No current facility-administered medications for this visit.      He  has a past medical history of Allergy.  He  has no past surgical history on file.  Social History     Tobacco Use   • Smoking status: Never Smoker   • Smokeless tobacco: Never Used   Substance Use Topics   • Alcohol use: Yes     Alcohol/week: 0.0 oz     Comment: Occasional beer or two with friends   • Drug use: Not Currently     Types: Marijuana     Comment: occasional, rare cocaine/LSD use      Social History     Social History Narrative   • Not on file     Family History   Problem Relation Age of Onset   • Hypertension Father    • Arthritis Father    • Hyperlipidemia Father    • Prostate cancer Paternal Grandmother    • Cancer Paternal Grandmother    • Cancer Maternal Grandfather      Family Status   Relation Name Status   • Fa Christo (Not Specified)   • PGMo Kendra (Not Specified)   • MGFa Hagen (Not Specified)         ROS  No chest pain, no abdominal pain, no rash.  Positive ROS as per HPI.  All other systems reviewed and are negative      Objective:     /72   Pulse 71   Temp 36.7 °C (98 °F) (Temporal)   Ht 1.727 m (5' 8\")   Wt 68 kg (150 lb)   SpO2 98%  Body mass index is 22.81 kg/m².     Physical Exam:    Constitutional: Alert, no distress.  Skin: Warm, dry, good turgor, no rashes in visible areas.  Eye: Equal, round, conjunctiva clear, lids normal.  ENMT: Lips " without lesions, good dentition  Respiratory: Unlabored respiratory effort  Cardiovascular: No edema.  Psych: Alert and oriented x3, normal affect and mood.      Assessment and Plan:   The following treatment plan was discussed    1. Pre-syncope  Unstable  No recent episodes  Cortisol stimulation test borderline, possible adrenal insufficiency  Follow-up with endocrinology and cardiology in the next few weeks      Records reviewed  Followup: Return in about 4 weeks (around 9/2/2019) for Follow-up on specialists.    I have placed the below orders and discussed them with an approved delegating provider. The MA is performing the below orders under the direction of Dr. Nevarez

## 2019-09-10 ENCOUNTER — OUTPATIENT INFUSION SERVICES (OUTPATIENT)
Dept: ONCOLOGY | Facility: MEDICAL CENTER | Age: 29
End: 2019-09-10
Attending: INTERNAL MEDICINE
Payer: COMMERCIAL

## 2019-09-10 VITALS
SYSTOLIC BLOOD PRESSURE: 117 MMHG | WEIGHT: 154.98 LBS | BODY MASS INDEX: 23.49 KG/M2 | DIASTOLIC BLOOD PRESSURE: 56 MMHG | HEIGHT: 68 IN | TEMPERATURE: 97.9 F | RESPIRATION RATE: 18 BRPM | HEART RATE: 68 BPM | OXYGEN SATURATION: 97 %

## 2019-09-10 DIAGNOSIS — R55 PRE-SYNCOPE: ICD-10-CM

## 2019-09-10 LAB
CORTIS SERPL-MCNC: 13.2 UG/DL (ref 0–23)
CORTIS SERPL-MCNC: 20.7 UG/DL (ref 0–23)
CORTIS SERPL-MCNC: 23.9 UG/DL (ref 0–23)
T4 FREE SERPL-MCNC: 1.1 NG/DL (ref 0.53–1.43)
TSH SERPL DL<=0.005 MIU/L-ACNC: 2.45 UIU/ML (ref 0.38–5.33)

## 2019-09-10 PROCEDURE — 82626 DEHYDROEPIANDROSTERONE: CPT

## 2019-09-10 PROCEDURE — 84439 ASSAY OF FREE THYROXINE: CPT

## 2019-09-10 PROCEDURE — 700111 HCHG RX REV CODE 636 W/ 250 OVERRIDE (IP): Performed by: FAMILY MEDICINE

## 2019-09-10 PROCEDURE — 82533 TOTAL CORTISOL: CPT | Mod: 91

## 2019-09-10 PROCEDURE — 82157 ASSAY OF ANDROSTENEDIONE: CPT

## 2019-09-10 PROCEDURE — 36415 COLL VENOUS BLD VENIPUNCTURE: CPT | Performed by: CLINICAL NURSE SPECIALIST

## 2019-09-10 PROCEDURE — 84143 ASSAY OF 17-HYDROXYPREGNENO: CPT

## 2019-09-10 PROCEDURE — 84443 ASSAY THYROID STIM HORMONE: CPT

## 2019-09-10 PROCEDURE — 96374 THER/PROPH/DIAG INJ IV PUSH: CPT | Performed by: CLINICAL NURSE SPECIALIST

## 2019-09-10 PROCEDURE — 83498 ASY HYDROXYPROGESTERONE 17-D: CPT

## 2019-09-10 PROCEDURE — 82024 ASSAY OF ACTH: CPT

## 2019-09-10 RX ORDER — COSYNTROPIN 0.25 MG/ML
0.25 INJECTION, POWDER, FOR SOLUTION INTRAMUSCULAR; INTRAVENOUS ONCE
Status: COMPLETED | OUTPATIENT
Start: 2019-09-10 | End: 2019-09-10

## 2019-09-10 RX ADMIN — COSYNTROPIN 250 MCG: 0.25 INJECTION, POWDER, LYOPHILIZED, FOR SOLUTION INTRAVENOUS at 08:28

## 2019-09-10 NOTE — PROGRESS NOTES
"Patient to infusion for stim test.  No new concerns/complaints.  States has not had syncopal episode \"for a while.\"  PIV started to right forearm with brisk blood return.  First cortisol drawn along with other ordered labs.  Cosyntropin given as ordered.  Cortisol drawn at 30 minutes and 60 minutes post injection.  PIV flushed, saline locked and removed with tip intact.  Patient discharged in stable condition.   "

## 2019-09-11 LAB — ACTH PLAS-MCNC: 11 PG/ML (ref 7.2–63.3)

## 2019-09-17 LAB
17OH-PREG SERPL-MCNC: 158 NG/DL
17OHP SERPL-MCNC: 81.5 NG/DL
ANDROST SERPL-MCNC: 0.87 NG/ML (ref 0.33–1.34)
DHEA SERPL-MCNC: 3.94 NG/ML (ref 1.33–7.78)

## 2019-09-20 ENCOUNTER — HOSPITAL ENCOUNTER (OUTPATIENT)
Dept: LAB | Facility: MEDICAL CENTER | Age: 29
End: 2019-09-20
Attending: INTERNAL MEDICINE
Payer: COMMERCIAL

## 2019-09-20 LAB — THYROPEROXIDASE AB SERPL-ACNC: 1.5 IU/ML (ref 0–9)

## 2019-09-20 PROCEDURE — 86376 MICROSOMAL ANTIBODY EACH: CPT

## 2019-09-20 PROCEDURE — 36415 COLL VENOUS BLD VENIPUNCTURE: CPT

## 2019-10-01 ENCOUNTER — APPOINTMENT (OUTPATIENT)
Dept: ENDOCRINOLOGY | Facility: MEDICAL CENTER | Age: 29
End: 2019-10-01
Payer: COMMERCIAL

## 2019-10-03 ENCOUNTER — HOSPITAL ENCOUNTER (OUTPATIENT)
Dept: LAB | Facility: MEDICAL CENTER | Age: 29
End: 2019-10-03
Attending: INTERNAL MEDICINE
Payer: COMMERCIAL

## 2019-10-03 PROCEDURE — 83519 RIA NONANTIBODY: CPT

## 2019-10-03 PROCEDURE — 36415 COLL VENOUS BLD VENIPUNCTURE: CPT

## 2019-10-09 LAB — TEST NAME 95000: NORMAL

## 2019-10-24 ENCOUNTER — OFFICE VISIT (OUTPATIENT)
Dept: DERMATOLOGY | Facility: IMAGING CENTER | Age: 29
End: 2019-10-24
Payer: COMMERCIAL

## 2019-10-24 VITALS — HEIGHT: 68 IN | BODY MASS INDEX: 21.98 KG/M2 | WEIGHT: 145 LBS | TEMPERATURE: 98.5 F

## 2019-10-24 DIAGNOSIS — L40.9 PSORIASIS: ICD-10-CM

## 2019-10-24 DIAGNOSIS — D22.9 MULTIPLE NEVI: ICD-10-CM

## 2019-10-24 PROCEDURE — 99203 OFFICE O/P NEW LOW 30 MIN: CPT | Performed by: DERMATOLOGY

## 2019-10-24 RX ORDER — FLUOCINONIDE GEL 0.5 MG/G
1 GEL TOPICAL
Qty: 60 G | Refills: 3 | Status: SHIPPED | OUTPATIENT
Start: 2019-10-24 | End: 2020-02-06 | Stop reason: SDUPTHER

## 2019-10-24 ASSESSMENT — ENCOUNTER SYMPTOMS
FEVER: 0
CHILLS: 0

## 2019-10-24 NOTE — PROGRESS NOTES
Dermatology New Patient Visit    Chief Complaint   Patient presents with   • Psoriasis       Subjective:     HPI:   Garcia Hilton is a 29 y.o. male presenting for    Psoriasis   Last saw dermatologists 4 yrs ago.  Also has a dry flaky scalp, uses OTC shampoo.  Has used Rx washes before for scalp, does not like the smell.  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     No current facility-administered medications on file prior to visit.        Allergies   Allergen Reactions   • Dust Mite Extract      Trouble breathing   • Egg White [Albumin]      Swollen and itchiness   • Fish      Hives, trouble breathing       Family History   Problem Relation Age of Onset   • Hypertension Father    • Arthritis Father    • Hyperlipidemia Father    • Prostate cancer Paternal Grandmother    • Cancer Paternal Grandmother    • Cancer Maternal Grandfather        Social History     Socioeconomic History   • Marital status: Single     Spouse name: Not on file   • Number of children: Not on file   • Years of education: Not on file   • Highest education level: Not on  file   Occupational History   • Not on file   Social Needs   • Financial resource strain: Not on file   • Food insecurity:     Worry: Not on file     Inability: Not on file   • Transportation needs:     Medical: Not on file     Non-medical: Not on file   Tobacco Use   • Smoking status: Never Smoker   • Smokeless tobacco: Never Used   Substance and Sexual Activity   • Alcohol use: Yes     Alcohol/week: 0.0 oz     Comment: Occasional beer or two with friends   • Drug use: Not Currently     Types: Marijuana     Comment: occasional, rare cocaine/LSD use    • Sexual activity: Yes     Partners: Female   Lifestyle   • Physical activity:     Days per week: Not on file     Minutes per session: Not on file   • Stress: Not on file   Relationships   • Social connections:     Talks on phone: Not on file     Gets together: Not on file     Attends Christianity service: Not on file     Active member of club or organization: Not on file     Attends meetings of clubs or organizations: Not on file     Relationship status: Not on file   • Intimate partner violence:     Fear of current or ex partner: Not on file     Emotionally abused: Not on file     Physically abused: Not on file     Forced sexual activity: Not on file   Other Topics Concern   • Not on file   Social History Narrative   • Not on file       Review of Systems   Constitutional: Negative for chills and fever.   Skin: Positive for itching and rash.        Objective:     A full mucocutaneous exam was completed including: scalp, hair, ears, face, eyelids, conjunctiva, lips, gums/tongue/oropharynx, neck, chest, abdomen, back, left and right upper extremities (including hands/digits and fingernails), left and right lower extremities (including feet/toes, toenails), buttocks, including external genitalia with the following pertinent findings listed below. Remaining above-listed examined areas within normal limits / negative for rashes or lesions.    Temp 36.9 °C (98.5 °F)   Ht  "1.727 m (5' 8\")   Wt 65.8 kg (145 lb)     Physical Exam   Constitutional: He is oriented to person, place, and time and well-developed, well-nourished, and in no distress.   HENT:   Head: Normocephalic and atraumatic.       Right Ear: External ear normal.   Left Ear: External ear normal.   Nose: Nose normal.   Mouth/Throat: Oropharynx is clear and moist.   Eyes: Conjunctivae and lids are normal.   Neck: Normal range of motion. Neck supple.   Cardiovascular: Intact distal pulses.   Pulmonary/Chest: Effort normal.   Neurological: He is alert and oriented to person, place, and time.   Skin: Skin is warm and dry.        Psychiatric: Mood and affect normal.   Vitals reviewed.      DATA: none applicable to review    Assessment and Plan:     1. Psoriasis, BSA ~2-3% active  -psoriatic arthritis? no  -educated patient about diagnosis, management options, and expectations of treatment  -discussed the nature of the condition and that there is no cure   -discussed associated co-morbidities, including systemic inflammation (i.e. Joint involvement, cardiovascular disease); smoking cessation discussed  -instructed to start duobrii 0.01/0.045% lotion to affected area of body twice daily to active areas until improved  -start lidex 0.05% gel to affected area of the scalp daily to BID  -Side effects of topical steroids discussed, including systemic absorption, skin thinning, appearance of stretch marks (striae), easy bruising, telangiectasias, and possible increased hair growth   -Patient instructed to avoid face, axilla, and groin area with above topical steroids  -discussed importance of moisturizing regularly/several times a day    2. Multiple nevi  - Benign-appearing nature of lesions discussed. Advised to return to clinic for any new or concerning changes.  - ABCDE's of melanoma discussed  - discussed importance of regular sun protection/sunscreen use, SPF 30 or greater with broad spectrum coverage, need for reapplication every "  minutes      Followup: Return in about 3 months (around 1/24/2020) for f/u psoriasis.    Allison Vuong M.D.

## 2020-02-06 ENCOUNTER — OFFICE VISIT (OUTPATIENT)
Dept: DERMATOLOGY | Facility: IMAGING CENTER | Age: 30
End: 2020-02-06
Payer: COMMERCIAL

## 2020-02-06 DIAGNOSIS — L40.9 PSORIASIS: ICD-10-CM

## 2020-02-06 PROCEDURE — 99213 OFFICE O/P EST LOW 20 MIN: CPT | Performed by: DERMATOLOGY

## 2020-02-06 RX ORDER — FLUOCINONIDE GEL 0.5 MG/G
1 GEL TOPICAL
Qty: 60 G | Refills: 3 | Status: SHIPPED | OUTPATIENT
Start: 2020-02-06

## 2020-02-06 NOTE — PROGRESS NOTES
CC: follow up psoriasis    Subjective:Previously seen patient here for PSO  Per patient has improved with treatment duobrii 0.01/0.045% lotion to affected area of body twice daily    lidex 0.05% gel to affected area of the scalp daily to BID  Areas to the feet, scalp and hands have almost cleared. No new areas of concern.     History of skin cancer: No  History of precancers/actinic keratoses: No  History of biopsies:Yes, Details: moles, benign   History of blistering/severe sunburns:Yes  Family history of skin cancer:No  Family history of atypical moles:No  Father PSA    ROS: no fevers/chills. No itch.  No joint pain  DermPMH: no skin cancer/melanoma  No problem-specific Assessment & Plan notes found for this encounter.    Relevant PMH: NC  Social:never smoker    PE: Gen:WDWN male in NAD. Skin: Scalp - psoriatic plaque, thin scale  Face/eyes/lips - not affected.  Few small papules on bilateral elbows.  Nail subungual hemorrhage left great toenail.  Small PSOatic plaque left great DIP and right ankle    A/P: PSO, reviewed dx/tx  -cont Lidex gel Qpm scalp  -cont duobrii lotion daily-BID skin  -f/u 6-9 months/PRN    I have reviewed medications relevant to my specialty.